# Patient Record
Sex: FEMALE | Race: WHITE | NOT HISPANIC OR LATINO | Employment: OTHER | URBAN - METROPOLITAN AREA
[De-identification: names, ages, dates, MRNs, and addresses within clinical notes are randomized per-mention and may not be internally consistent; named-entity substitution may affect disease eponyms.]

---

## 2017-01-13 ENCOUNTER — RECORDS - HEALTHEAST (OUTPATIENT)
Dept: GENERAL RADIOLOGY | Facility: CLINIC | Age: 82
End: 2017-01-13

## 2017-01-13 ENCOUNTER — OFFICE VISIT - HEALTHEAST (OUTPATIENT)
Dept: INTERNAL MEDICINE | Facility: CLINIC | Age: 82
End: 2017-01-13

## 2017-01-13 DIAGNOSIS — M25.539 PAIN IN UNSPECIFIED WRIST: ICD-10-CM

## 2017-01-13 DIAGNOSIS — M25.539 WRIST PAIN: ICD-10-CM

## 2017-01-13 ASSESSMENT — MIFFLIN-ST. JEOR: SCORE: 935.77

## 2017-01-14 ENCOUNTER — COMMUNICATION - HEALTHEAST (OUTPATIENT)
Dept: SCHEDULING | Facility: CLINIC | Age: 82
End: 2017-01-14

## 2017-01-17 ENCOUNTER — RECORDS - HEALTHEAST (OUTPATIENT)
Dept: ADMINISTRATIVE | Facility: OTHER | Age: 82
End: 2017-01-17

## 2017-01-24 ENCOUNTER — RECORDS - HEALTHEAST (OUTPATIENT)
Dept: ADMINISTRATIVE | Facility: OTHER | Age: 82
End: 2017-01-24

## 2017-01-31 ENCOUNTER — COMMUNICATION - HEALTHEAST (OUTPATIENT)
Dept: INTERNAL MEDICINE | Facility: CLINIC | Age: 82
End: 2017-01-31

## 2017-02-07 ENCOUNTER — COMMUNICATION - HEALTHEAST (OUTPATIENT)
Dept: INTERNAL MEDICINE | Facility: CLINIC | Age: 82
End: 2017-02-07

## 2017-02-14 ENCOUNTER — RECORDS - HEALTHEAST (OUTPATIENT)
Dept: ADMINISTRATIVE | Facility: OTHER | Age: 82
End: 2017-02-14

## 2017-02-21 ENCOUNTER — OFFICE VISIT - HEALTHEAST (OUTPATIENT)
Dept: INTERNAL MEDICINE | Facility: CLINIC | Age: 82
End: 2017-02-21

## 2017-02-21 DIAGNOSIS — I48.20 CHRONIC ATRIAL FIBRILLATION (H): ICD-10-CM

## 2017-02-21 LAB
CHOLEST SERPL-MCNC: 180 MG/DL
FASTING STATUS PATIENT QL REPORTED: YES
HDLC SERPL-MCNC: 76 MG/DL
LDLC SERPL CALC-MCNC: 85 MG/DL
TRIGL SERPL-MCNC: 93 MG/DL

## 2017-02-21 ASSESSMENT — MIFFLIN-ST. JEOR: SCORE: 944.84

## 2017-02-22 ENCOUNTER — COMMUNICATION - HEALTHEAST (OUTPATIENT)
Dept: INTERNAL MEDICINE | Facility: CLINIC | Age: 82
End: 2017-02-22

## 2017-03-14 ENCOUNTER — RECORDS - HEALTHEAST (OUTPATIENT)
Dept: ADMINISTRATIVE | Facility: OTHER | Age: 82
End: 2017-03-14

## 2017-03-20 ENCOUNTER — COMMUNICATION - HEALTHEAST (OUTPATIENT)
Dept: INTERNAL MEDICINE | Facility: CLINIC | Age: 82
End: 2017-03-20

## 2017-04-21 ENCOUNTER — COMMUNICATION - HEALTHEAST (OUTPATIENT)
Dept: INTERNAL MEDICINE | Facility: CLINIC | Age: 82
End: 2017-04-21

## 2017-04-24 ENCOUNTER — OFFICE VISIT - HEALTHEAST (OUTPATIENT)
Dept: INTERNAL MEDICINE | Facility: CLINIC | Age: 82
End: 2017-04-24

## 2017-04-24 DIAGNOSIS — I63.419 CEREBRAL INFARCTION DUE TO EMBOLISM OF MIDDLE CEREBRAL ARTERY, UNSPECIFIED BLOOD VESSEL LATERALITY (H): ICD-10-CM

## 2017-04-24 LAB
CHOLEST SERPL-MCNC: 158 MG/DL
FASTING STATUS PATIENT QL REPORTED: YES
HDLC SERPL-MCNC: 64 MG/DL
LDLC SERPL CALC-MCNC: 78 MG/DL
TRIGL SERPL-MCNC: 81 MG/DL

## 2017-04-24 ASSESSMENT — MIFFLIN-ST. JEOR: SCORE: 951.64

## 2017-04-25 ENCOUNTER — COMMUNICATION - HEALTHEAST (OUTPATIENT)
Dept: INTERNAL MEDICINE | Facility: CLINIC | Age: 82
End: 2017-04-25

## 2017-04-28 ENCOUNTER — RECORDS - HEALTHEAST (OUTPATIENT)
Dept: ADMINISTRATIVE | Facility: OTHER | Age: 82
End: 2017-04-28

## 2017-05-01 ENCOUNTER — RECORDS - HEALTHEAST (OUTPATIENT)
Dept: ADMINISTRATIVE | Facility: OTHER | Age: 82
End: 2017-05-01

## 2017-05-03 ENCOUNTER — RECORDS - HEALTHEAST (OUTPATIENT)
Dept: ADMINISTRATIVE | Facility: OTHER | Age: 82
End: 2017-05-03

## 2017-05-12 ENCOUNTER — COMMUNICATION - HEALTHEAST (OUTPATIENT)
Dept: INTERNAL MEDICINE | Facility: CLINIC | Age: 82
End: 2017-05-12

## 2017-05-12 DIAGNOSIS — I63.9 CVA (CEREBRAL VASCULAR ACCIDENT) (H): ICD-10-CM

## 2017-05-16 ENCOUNTER — OFFICE VISIT - HEALTHEAST (OUTPATIENT)
Dept: PODIATRY | Age: 82
End: 2017-05-16

## 2017-05-16 DIAGNOSIS — M20.40 HAMMER TOE, UNSPECIFIED LATERALITY: ICD-10-CM

## 2017-05-30 ENCOUNTER — RECORDS - HEALTHEAST (OUTPATIENT)
Dept: ADMINISTRATIVE | Facility: OTHER | Age: 82
End: 2017-05-30

## 2017-06-05 ENCOUNTER — RECORDS - HEALTHEAST (OUTPATIENT)
Dept: ADMINISTRATIVE | Facility: OTHER | Age: 82
End: 2017-06-05

## 2017-06-20 ENCOUNTER — COMMUNICATION - HEALTHEAST (OUTPATIENT)
Dept: INTERNAL MEDICINE | Facility: CLINIC | Age: 82
End: 2017-06-20

## 2017-06-26 ENCOUNTER — COMMUNICATION - HEALTHEAST (OUTPATIENT)
Dept: INTERNAL MEDICINE | Facility: CLINIC | Age: 82
End: 2017-06-26

## 2017-06-26 ENCOUNTER — OFFICE VISIT - HEALTHEAST (OUTPATIENT)
Dept: INTERNAL MEDICINE | Facility: CLINIC | Age: 82
End: 2017-06-26

## 2017-06-26 DIAGNOSIS — I63.419 CEREBRAL INFARCTION DUE TO EMBOLISM OF MIDDLE CEREBRAL ARTERY, UNSPECIFIED BLOOD VESSEL LATERALITY (H): ICD-10-CM

## 2017-06-26 LAB
CHOLEST SERPL-MCNC: 193 MG/DL
FASTING STATUS PATIENT QL REPORTED: YES
HDLC SERPL-MCNC: 61 MG/DL
LDLC SERPL CALC-MCNC: 117 MG/DL
TRIGL SERPL-MCNC: 74 MG/DL

## 2017-06-26 ASSESSMENT — MIFFLIN-ST. JEOR: SCORE: 933.5

## 2017-06-27 ENCOUNTER — COMMUNICATION - HEALTHEAST (OUTPATIENT)
Dept: INTERNAL MEDICINE | Facility: CLINIC | Age: 82
End: 2017-06-27

## 2017-07-06 ENCOUNTER — COMMUNICATION - HEALTHEAST (OUTPATIENT)
Dept: INTERNAL MEDICINE | Facility: CLINIC | Age: 82
End: 2017-07-06

## 2017-07-10 ENCOUNTER — COMMUNICATION - HEALTHEAST (OUTPATIENT)
Dept: INTERNAL MEDICINE | Facility: CLINIC | Age: 82
End: 2017-07-10

## 2017-07-14 ENCOUNTER — COMMUNICATION - HEALTHEAST (OUTPATIENT)
Dept: INTERNAL MEDICINE | Facility: CLINIC | Age: 82
End: 2017-07-14

## 2017-07-16 ENCOUNTER — COMMUNICATION - HEALTHEAST (OUTPATIENT)
Dept: INTERNAL MEDICINE | Facility: CLINIC | Age: 82
End: 2017-07-16

## 2017-07-17 ENCOUNTER — COMMUNICATION - HEALTHEAST (OUTPATIENT)
Dept: INTERNAL MEDICINE | Facility: CLINIC | Age: 82
End: 2017-07-17

## 2017-07-19 ENCOUNTER — COMMUNICATION - HEALTHEAST (OUTPATIENT)
Dept: INTERNAL MEDICINE | Facility: CLINIC | Age: 82
End: 2017-07-19

## 2017-07-28 ENCOUNTER — COMMUNICATION - HEALTHEAST (OUTPATIENT)
Dept: INTERNAL MEDICINE | Facility: CLINIC | Age: 82
End: 2017-07-28

## 2017-07-31 ENCOUNTER — COMMUNICATION - HEALTHEAST (OUTPATIENT)
Dept: INTERNAL MEDICINE | Facility: CLINIC | Age: 82
End: 2017-07-31

## 2017-07-31 ENCOUNTER — OFFICE VISIT - HEALTHEAST (OUTPATIENT)
Dept: INTERNAL MEDICINE | Facility: CLINIC | Age: 82
End: 2017-07-31

## 2017-07-31 DIAGNOSIS — I63.419 CEREBRAL INFARCTION DUE TO EMBOLISM OF MIDDLE CEREBRAL ARTERY, UNSPECIFIED BLOOD VESSEL LATERALITY (H): ICD-10-CM

## 2017-07-31 ASSESSMENT — MIFFLIN-ST. JEOR: SCORE: 942.57

## 2017-08-28 ENCOUNTER — RECORDS - HEALTHEAST (OUTPATIENT)
Dept: ADMINISTRATIVE | Facility: OTHER | Age: 82
End: 2017-08-28

## 2017-09-05 ENCOUNTER — OFFICE VISIT - HEALTHEAST (OUTPATIENT)
Dept: INTERNAL MEDICINE | Facility: CLINIC | Age: 82
End: 2017-09-05

## 2017-09-05 ENCOUNTER — COMMUNICATION - HEALTHEAST (OUTPATIENT)
Dept: INTERNAL MEDICINE | Facility: CLINIC | Age: 82
End: 2017-09-05

## 2017-09-05 DIAGNOSIS — I63.419 CEREBRAL INFARCTION DUE TO EMBOLISM OF MIDDLE CEREBRAL ARTERY, UNSPECIFIED BLOOD VESSEL LATERALITY (H): ICD-10-CM

## 2017-09-05 LAB
CHOLEST SERPL-MCNC: 205 MG/DL
FASTING STATUS PATIENT QL REPORTED: ABNORMAL
HDLC SERPL-MCNC: 62 MG/DL
LDLC SERPL CALC-MCNC: 123 MG/DL
TRIGL SERPL-MCNC: 98 MG/DL

## 2017-09-05 ASSESSMENT — MIFFLIN-ST. JEOR: SCORE: 924.42

## 2017-09-07 ENCOUNTER — COMMUNICATION - HEALTHEAST (OUTPATIENT)
Dept: INTERNAL MEDICINE | Facility: CLINIC | Age: 82
End: 2017-09-07

## 2017-09-20 ENCOUNTER — RECORDS - HEALTHEAST (OUTPATIENT)
Dept: ADMINISTRATIVE | Facility: OTHER | Age: 82
End: 2017-09-20

## 2017-09-29 ENCOUNTER — RECORDS - HEALTHEAST (OUTPATIENT)
Dept: ADMINISTRATIVE | Facility: OTHER | Age: 82
End: 2017-09-29

## 2017-10-04 ENCOUNTER — AMBULATORY - HEALTHEAST (OUTPATIENT)
Dept: INTERNAL MEDICINE | Facility: CLINIC | Age: 82
End: 2017-10-04

## 2017-10-04 ENCOUNTER — COMMUNICATION - HEALTHEAST (OUTPATIENT)
Dept: INTERNAL MEDICINE | Facility: CLINIC | Age: 82
End: 2017-10-04

## 2017-10-04 DIAGNOSIS — I63.9 STROKE (H): ICD-10-CM

## 2017-10-05 ENCOUNTER — HOME CARE/HOSPICE - HEALTHEAST (OUTPATIENT)
Dept: HOME HEALTH SERVICES | Facility: HOME HEALTH | Age: 82
End: 2017-10-05

## 2017-10-05 ENCOUNTER — COMMUNICATION - HEALTHEAST (OUTPATIENT)
Dept: NURSING | Facility: CLINIC | Age: 82
End: 2017-10-05

## 2017-10-06 ENCOUNTER — COMMUNICATION - HEALTHEAST (OUTPATIENT)
Dept: HOME HEALTH SERVICES | Facility: HOME HEALTH | Age: 82
End: 2017-10-06

## 2017-10-13 ENCOUNTER — COMMUNICATION - HEALTHEAST (OUTPATIENT)
Dept: INTERNAL MEDICINE | Facility: CLINIC | Age: 82
End: 2017-10-13

## 2017-10-24 ENCOUNTER — COMMUNICATION - HEALTHEAST (OUTPATIENT)
Dept: NURSING | Facility: CLINIC | Age: 82
End: 2017-10-24

## 2017-10-25 ENCOUNTER — AMBULATORY - HEALTHEAST (OUTPATIENT)
Dept: CARE COORDINATION | Facility: CLINIC | Age: 82
End: 2017-10-25

## 2017-11-06 ENCOUNTER — OFFICE VISIT - HEALTHEAST (OUTPATIENT)
Dept: INTERNAL MEDICINE | Facility: CLINIC | Age: 82
End: 2017-11-06

## 2017-11-06 ENCOUNTER — COMMUNICATION - HEALTHEAST (OUTPATIENT)
Dept: NURSING | Facility: CLINIC | Age: 82
End: 2017-11-06

## 2017-11-06 ENCOUNTER — AMBULATORY - HEALTHEAST (OUTPATIENT)
Dept: INTERNAL MEDICINE | Facility: CLINIC | Age: 82
End: 2017-11-06

## 2017-11-06 ENCOUNTER — HOME CARE/HOSPICE - HEALTHEAST (OUTPATIENT)
Dept: HOME HEALTH SERVICES | Facility: HOME HEALTH | Age: 82
End: 2017-11-06

## 2017-11-06 DIAGNOSIS — I63.9 CVA (CEREBRAL VASCULAR ACCIDENT) (H): ICD-10-CM

## 2017-11-06 DIAGNOSIS — I48.20 CHRONIC ATRIAL FIBRILLATION (H): ICD-10-CM

## 2017-11-06 DIAGNOSIS — I63.419 CEREBRAL INFARCTION DUE TO EMBOLISM OF MIDDLE CEREBRAL ARTERY, UNSPECIFIED BLOOD VESSEL LATERALITY (H): ICD-10-CM

## 2017-11-06 ASSESSMENT — MIFFLIN-ST. JEOR: SCORE: 938.03

## 2017-11-08 ENCOUNTER — HOME CARE/HOSPICE - HEALTHEAST (OUTPATIENT)
Dept: HOME HEALTH SERVICES | Facility: HOME HEALTH | Age: 82
End: 2017-11-08

## 2017-11-08 ASSESSMENT — MIFFLIN-ST. JEOR: SCORE: 938.03

## 2017-11-15 ENCOUNTER — AMBULATORY - HEALTHEAST (OUTPATIENT)
Dept: INTERNAL MEDICINE | Facility: CLINIC | Age: 82
End: 2017-11-15

## 2017-11-30 ENCOUNTER — COMMUNICATION - HEALTHEAST (OUTPATIENT)
Dept: NURSING | Facility: CLINIC | Age: 82
End: 2017-11-30

## 2017-12-22 ENCOUNTER — COMMUNICATION - HEALTHEAST (OUTPATIENT)
Dept: NURSING | Facility: CLINIC | Age: 82
End: 2017-12-22

## 2018-01-08 ENCOUNTER — COMMUNICATION - HEALTHEAST (OUTPATIENT)
Dept: INTERNAL MEDICINE | Facility: CLINIC | Age: 83
End: 2018-01-08

## 2018-01-26 ENCOUNTER — OFFICE VISIT - HEALTHEAST (OUTPATIENT)
Dept: INTERNAL MEDICINE | Facility: CLINIC | Age: 83
End: 2018-01-26

## 2018-01-26 DIAGNOSIS — I63.419 CEREBRAL INFARCTION DUE TO EMBOLISM OF MIDDLE CEREBRAL ARTERY, UNSPECIFIED BLOOD VESSEL LATERALITY (H): ICD-10-CM

## 2018-01-26 LAB
CHOLEST SERPL-MCNC: 170 MG/DL
FASTING STATUS PATIENT QL REPORTED: NORMAL
HDLC SERPL-MCNC: 69 MG/DL
LDLC SERPL CALC-MCNC: 87 MG/DL
TRIGL SERPL-MCNC: 71 MG/DL

## 2018-01-26 ASSESSMENT — MIFFLIN-ST. JEOR: SCORE: 933.5

## 2018-01-29 ENCOUNTER — COMMUNICATION - HEALTHEAST (OUTPATIENT)
Dept: INTERNAL MEDICINE | Facility: CLINIC | Age: 83
End: 2018-01-29

## 2018-01-30 ENCOUNTER — COMMUNICATION - HEALTHEAST (OUTPATIENT)
Dept: NURSING | Facility: CLINIC | Age: 83
End: 2018-01-30

## 2018-01-30 ENCOUNTER — AMBULATORY - HEALTHEAST (OUTPATIENT)
Dept: INTERNAL MEDICINE | Facility: CLINIC | Age: 83
End: 2018-01-30

## 2018-01-30 DIAGNOSIS — I63.9 STROKE (H): ICD-10-CM

## 2018-02-20 ENCOUNTER — RECORDS - HEALTHEAST (OUTPATIENT)
Dept: ADMINISTRATIVE | Facility: OTHER | Age: 83
End: 2018-02-20

## 2018-02-27 ENCOUNTER — COMMUNICATION - HEALTHEAST (OUTPATIENT)
Dept: NURSING | Facility: CLINIC | Age: 83
End: 2018-02-27

## 2018-03-09 ENCOUNTER — COMMUNICATION - HEALTHEAST (OUTPATIENT)
Dept: INTERNAL MEDICINE | Facility: CLINIC | Age: 83
End: 2018-03-09

## 2018-03-12 ENCOUNTER — COMMUNICATION - HEALTHEAST (OUTPATIENT)
Dept: INTERNAL MEDICINE | Facility: CLINIC | Age: 83
End: 2018-03-12

## 2018-03-12 ENCOUNTER — RECORDS - HEALTHEAST (OUTPATIENT)
Dept: ADMINISTRATIVE | Facility: OTHER | Age: 83
End: 2018-03-12

## 2018-03-12 DIAGNOSIS — R56.9 SEIZURE (H): ICD-10-CM

## 2018-03-16 ENCOUNTER — RECORDS - HEALTHEAST (OUTPATIENT)
Dept: ADMINISTRATIVE | Facility: OTHER | Age: 83
End: 2018-03-16

## 2018-03-20 ENCOUNTER — COMMUNICATION - HEALTHEAST (OUTPATIENT)
Dept: NURSING | Facility: CLINIC | Age: 83
End: 2018-03-20

## 2018-03-27 ENCOUNTER — OFFICE VISIT - HEALTHEAST (OUTPATIENT)
Dept: INTERNAL MEDICINE | Facility: CLINIC | Age: 83
End: 2018-03-27

## 2018-03-27 ENCOUNTER — COMMUNICATION - HEALTHEAST (OUTPATIENT)
Dept: INTERNAL MEDICINE | Facility: CLINIC | Age: 83
End: 2018-03-27

## 2018-03-27 DIAGNOSIS — I63.419 CEREBRAL INFARCTION DUE TO EMBOLISM OF MIDDLE CEREBRAL ARTERY, UNSPECIFIED BLOOD VESSEL LATERALITY (H): ICD-10-CM

## 2018-03-27 LAB
CHOLEST SERPL-MCNC: 165 MG/DL
FASTING STATUS PATIENT QL REPORTED: NORMAL
HDLC SERPL-MCNC: 68 MG/DL
LDLC SERPL CALC-MCNC: 82 MG/DL
TRIGL SERPL-MCNC: 77 MG/DL

## 2018-03-27 ASSESSMENT — MIFFLIN-ST. JEOR: SCORE: 933.5

## 2018-04-10 ENCOUNTER — RECORDS - HEALTHEAST (OUTPATIENT)
Dept: ADMINISTRATIVE | Facility: OTHER | Age: 83
End: 2018-04-10

## 2018-04-13 ENCOUNTER — COMMUNICATION - HEALTHEAST (OUTPATIENT)
Dept: NURSING | Facility: CLINIC | Age: 83
End: 2018-04-13

## 2018-05-08 ENCOUNTER — RECORDS - HEALTHEAST (OUTPATIENT)
Dept: ADMINISTRATIVE | Facility: OTHER | Age: 83
End: 2018-05-08

## 2018-05-12 ENCOUNTER — COMMUNICATION - HEALTHEAST (OUTPATIENT)
Dept: INTERNAL MEDICINE | Facility: CLINIC | Age: 83
End: 2018-05-12

## 2018-05-15 ENCOUNTER — COMMUNICATION - HEALTHEAST (OUTPATIENT)
Dept: NURSING | Facility: CLINIC | Age: 83
End: 2018-05-15

## 2018-05-22 ENCOUNTER — COMMUNICATION - HEALTHEAST (OUTPATIENT)
Dept: NURSING | Facility: CLINIC | Age: 83
End: 2018-05-22

## 2018-06-01 ENCOUNTER — COMMUNICATION - HEALTHEAST (OUTPATIENT)
Dept: NURSING | Facility: CLINIC | Age: 83
End: 2018-06-01

## 2018-06-28 ENCOUNTER — OFFICE VISIT - HEALTHEAST (OUTPATIENT)
Dept: INTERNAL MEDICINE | Facility: CLINIC | Age: 83
End: 2018-06-28

## 2018-06-28 DIAGNOSIS — I10 ESSENTIAL HYPERTENSION: ICD-10-CM

## 2018-06-28 ASSESSMENT — MIFFLIN-ST. JEOR: SCORE: 928.96

## 2018-07-10 ENCOUNTER — COMMUNICATION - HEALTHEAST (OUTPATIENT)
Dept: NURSING | Facility: CLINIC | Age: 83
End: 2018-07-10

## 2018-07-15 ENCOUNTER — COMMUNICATION - HEALTHEAST (OUTPATIENT)
Dept: INTERNAL MEDICINE | Facility: CLINIC | Age: 83
End: 2018-07-15

## 2018-07-24 ENCOUNTER — COMMUNICATION - HEALTHEAST (OUTPATIENT)
Dept: NURSING | Facility: CLINIC | Age: 83
End: 2018-07-24

## 2018-07-31 ENCOUNTER — AMBULATORY - HEALTHEAST (OUTPATIENT)
Dept: INTERNAL MEDICINE | Facility: CLINIC | Age: 83
End: 2018-07-31

## 2018-08-29 ENCOUNTER — OFFICE VISIT - HEALTHEAST (OUTPATIENT)
Dept: INTERNAL MEDICINE | Facility: CLINIC | Age: 83
End: 2018-08-29

## 2018-08-29 DIAGNOSIS — I63.419 CEREBRAL INFARCTION DUE TO EMBOLISM OF MIDDLE CEREBRAL ARTERY, UNSPECIFIED BLOOD VESSEL LATERALITY (H): ICD-10-CM

## 2018-08-29 LAB
CHOLEST SERPL-MCNC: 185 MG/DL
FASTING STATUS PATIENT QL REPORTED: NORMAL
HDLC SERPL-MCNC: 72 MG/DL
LDLC SERPL CALC-MCNC: 98 MG/DL
TRIGL SERPL-MCNC: 74 MG/DL

## 2018-08-29 ASSESSMENT — MIFFLIN-ST. JEOR: SCORE: 947.1

## 2018-08-30 ENCOUNTER — COMMUNICATION - HEALTHEAST (OUTPATIENT)
Dept: INTERNAL MEDICINE | Facility: CLINIC | Age: 83
End: 2018-08-30

## 2018-08-30 ENCOUNTER — COMMUNICATION - HEALTHEAST (OUTPATIENT)
Dept: NURSING | Facility: CLINIC | Age: 83
End: 2018-08-30

## 2018-08-30 ENCOUNTER — RECORDS - HEALTHEAST (OUTPATIENT)
Dept: ADMINISTRATIVE | Facility: OTHER | Age: 83
End: 2018-08-30

## 2018-08-31 ENCOUNTER — COMMUNICATION - HEALTHEAST (OUTPATIENT)
Dept: INTERNAL MEDICINE | Facility: CLINIC | Age: 83
End: 2018-08-31

## 2018-08-31 ENCOUNTER — RECORDS - HEALTHEAST (OUTPATIENT)
Dept: ADMINISTRATIVE | Facility: OTHER | Age: 83
End: 2018-08-31

## 2018-09-01 ENCOUNTER — COMMUNICATION - HEALTHEAST (OUTPATIENT)
Dept: INTERNAL MEDICINE | Facility: CLINIC | Age: 83
End: 2018-09-01

## 2018-09-04 ENCOUNTER — AMBULATORY - HEALTHEAST (OUTPATIENT)
Dept: INTERNAL MEDICINE | Facility: CLINIC | Age: 83
End: 2018-09-04

## 2018-09-10 ENCOUNTER — OFFICE VISIT - HEALTHEAST (OUTPATIENT)
Dept: INTERNAL MEDICINE | Facility: CLINIC | Age: 83
End: 2018-09-10

## 2018-09-10 DIAGNOSIS — I48.20 CHRONIC ATRIAL FIBRILLATION (H): ICD-10-CM

## 2018-09-10 ASSESSMENT — MIFFLIN-ST. JEOR: SCORE: 928.96

## 2018-09-11 ENCOUNTER — COMMUNICATION - HEALTHEAST (OUTPATIENT)
Dept: INTERNAL MEDICINE | Facility: CLINIC | Age: 83
End: 2018-09-11

## 2018-10-09 ENCOUNTER — COMMUNICATION - HEALTHEAST (OUTPATIENT)
Dept: NURSING | Facility: CLINIC | Age: 83
End: 2018-10-09

## 2018-10-22 ENCOUNTER — COMMUNICATION - HEALTHEAST (OUTPATIENT)
Dept: NURSING | Facility: CLINIC | Age: 83
End: 2018-10-22

## 2018-11-09 ENCOUNTER — COMMUNICATION - HEALTHEAST (OUTPATIENT)
Dept: NURSING | Facility: CLINIC | Age: 83
End: 2018-11-09

## 2018-12-11 ENCOUNTER — COMMUNICATION - HEALTHEAST (OUTPATIENT)
Dept: NURSING | Facility: CLINIC | Age: 83
End: 2018-12-11

## 2018-12-18 ENCOUNTER — COMMUNICATION - HEALTHEAST (OUTPATIENT)
Dept: NURSING | Facility: CLINIC | Age: 83
End: 2018-12-18

## 2018-12-26 ENCOUNTER — COMMUNICATION - HEALTHEAST (OUTPATIENT)
Dept: NURSING | Facility: CLINIC | Age: 83
End: 2018-12-26

## 2019-01-04 ENCOUNTER — OFFICE VISIT - HEALTHEAST (OUTPATIENT)
Dept: INTERNAL MEDICINE | Facility: CLINIC | Age: 84
End: 2019-01-04

## 2019-01-04 DIAGNOSIS — I63.419 CEREBRAL INFARCTION DUE TO EMBOLISM OF MIDDLE CEREBRAL ARTERY, UNSPECIFIED BLOOD VESSEL LATERALITY (H): ICD-10-CM

## 2019-01-04 RX ORDER — PRAVASTATIN SODIUM 40 MG
40 TABLET ORAL AT BEDTIME
Status: SHIPPED | COMMUNITY
Start: 2019-01-04 | End: 2021-11-12

## 2019-01-04 ASSESSMENT — MIFFLIN-ST. JEOR: SCORE: 947.1

## 2019-01-31 ENCOUNTER — COMMUNICATION - HEALTHEAST (OUTPATIENT)
Dept: NURSING | Facility: CLINIC | Age: 84
End: 2019-01-31

## 2019-02-22 ENCOUNTER — COMMUNICATION - HEALTHEAST (OUTPATIENT)
Dept: INTERNAL MEDICINE | Facility: CLINIC | Age: 84
End: 2019-02-22

## 2019-02-22 ENCOUNTER — AMBULATORY - HEALTHEAST (OUTPATIENT)
Dept: INTERNAL MEDICINE | Facility: CLINIC | Age: 84
End: 2019-02-22

## 2019-02-22 DIAGNOSIS — M62.579: ICD-10-CM

## 2019-03-01 ENCOUNTER — DOCUMENTATION ONLY (OUTPATIENT)
Dept: OTHER | Facility: CLINIC | Age: 84
End: 2019-03-01

## 2019-03-01 ENCOUNTER — COMMUNICATION - HEALTHEAST (OUTPATIENT)
Dept: NURSING | Facility: CLINIC | Age: 84
End: 2019-03-01

## 2019-03-01 ENCOUNTER — AMBULATORY - HEALTHEAST (OUTPATIENT)
Dept: OTHER | Facility: CLINIC | Age: 84
End: 2019-03-01

## 2019-03-07 ENCOUNTER — COMMUNICATION - HEALTHEAST (OUTPATIENT)
Dept: INTERNAL MEDICINE | Facility: CLINIC | Age: 84
End: 2019-03-07

## 2019-03-07 DIAGNOSIS — R56.9 SEIZURE (H): ICD-10-CM

## 2019-03-12 ENCOUNTER — AMBULATORY - HEALTHEAST (OUTPATIENT)
Dept: NURSING | Facility: CLINIC | Age: 84
End: 2019-03-12

## 2019-03-19 ENCOUNTER — COMMUNICATION - HEALTHEAST (OUTPATIENT)
Dept: NURSING | Facility: CLINIC | Age: 84
End: 2019-03-19

## 2019-04-09 ENCOUNTER — COMMUNICATION - HEALTHEAST (OUTPATIENT)
Dept: NURSING | Facility: CLINIC | Age: 84
End: 2019-04-09

## 2019-04-15 ENCOUNTER — OFFICE VISIT - HEALTHEAST (OUTPATIENT)
Dept: INTERNAL MEDICINE | Facility: CLINIC | Age: 84
End: 2019-04-15

## 2019-04-15 DIAGNOSIS — I48.20 CHRONIC ATRIAL FIBRILLATION (H): ICD-10-CM

## 2019-04-15 DIAGNOSIS — R56.9 CONVULSIONS, UNSPECIFIED CONVULSION TYPE (H): ICD-10-CM

## 2019-04-15 DIAGNOSIS — C50.919 MALIGNANT NEOPLASM OF FEMALE BREAST, UNSPECIFIED ESTROGEN RECEPTOR STATUS, UNSPECIFIED LATERALITY, UNSPECIFIED SITE OF BREAST (H): ICD-10-CM

## 2019-04-15 DIAGNOSIS — I10 ESSENTIAL HYPERTENSION: ICD-10-CM

## 2019-04-15 LAB
CHOLEST SERPL-MCNC: 156 MG/DL
FASTING STATUS PATIENT QL REPORTED: YES
HDLC SERPL-MCNC: 64 MG/DL
LDLC SERPL CALC-MCNC: 78 MG/DL
TRIGL SERPL-MCNC: 68 MG/DL

## 2019-04-15 ASSESSMENT — MIFFLIN-ST. JEOR: SCORE: 942.57

## 2019-04-16 ENCOUNTER — COMMUNICATION - HEALTHEAST (OUTPATIENT)
Dept: INTERNAL MEDICINE | Facility: CLINIC | Age: 84
End: 2019-04-16

## 2019-04-24 ENCOUNTER — COMMUNICATION - HEALTHEAST (OUTPATIENT)
Dept: NURSING | Facility: CLINIC | Age: 84
End: 2019-04-24

## 2019-05-08 ENCOUNTER — COMMUNICATION - HEALTHEAST (OUTPATIENT)
Dept: INTERNAL MEDICINE | Facility: CLINIC | Age: 84
End: 2019-05-08

## 2019-05-24 ENCOUNTER — COMMUNICATION - HEALTHEAST (OUTPATIENT)
Dept: NURSING | Facility: CLINIC | Age: 84
End: 2019-05-24

## 2019-06-05 ENCOUNTER — COMMUNICATION - HEALTHEAST (OUTPATIENT)
Dept: NURSING | Facility: CLINIC | Age: 84
End: 2019-06-05

## 2019-07-16 ENCOUNTER — COMMUNICATION - HEALTHEAST (OUTPATIENT)
Dept: NURSING | Facility: CLINIC | Age: 84
End: 2019-07-16

## 2019-07-23 ENCOUNTER — COMMUNICATION - HEALTHEAST (OUTPATIENT)
Dept: NURSING | Facility: CLINIC | Age: 84
End: 2019-07-23

## 2019-07-30 ENCOUNTER — OFFICE VISIT - HEALTHEAST (OUTPATIENT)
Dept: INTERNAL MEDICINE | Facility: CLINIC | Age: 84
End: 2019-07-30

## 2019-07-30 DIAGNOSIS — I63.419 CEREBRAL INFARCTION DUE TO EMBOLISM OF MIDDLE CEREBRAL ARTERY, UNSPECIFIED BLOOD VESSEL LATERALITY (H): ICD-10-CM

## 2019-07-30 ASSESSMENT — MIFFLIN-ST. JEOR: SCORE: 951.64

## 2019-08-02 ENCOUNTER — COMMUNICATION - HEALTHEAST (OUTPATIENT)
Dept: NURSING | Facility: CLINIC | Age: 84
End: 2019-08-02

## 2019-08-30 ENCOUNTER — COMMUNICATION - HEALTHEAST (OUTPATIENT)
Dept: NURSING | Facility: CLINIC | Age: 84
End: 2019-08-30

## 2019-10-20 ENCOUNTER — COMMUNICATION - HEALTHEAST (OUTPATIENT)
Dept: SCHEDULING | Facility: CLINIC | Age: 84
End: 2019-10-20

## 2019-11-07 ENCOUNTER — OFFICE VISIT - HEALTHEAST (OUTPATIENT)
Dept: INTERNAL MEDICINE | Facility: CLINIC | Age: 84
End: 2019-11-07

## 2019-11-07 DIAGNOSIS — I10 ESSENTIAL HYPERTENSION: ICD-10-CM

## 2019-11-07 LAB
CHOLEST SERPL-MCNC: 162 MG/DL
FASTING STATUS PATIENT QL REPORTED: NORMAL
HDLC SERPL-MCNC: 66 MG/DL
LDLC SERPL CALC-MCNC: 84 MG/DL
TRIGL SERPL-MCNC: 60 MG/DL

## 2019-11-07 ASSESSMENT — MIFFLIN-ST. JEOR: SCORE: 974.32

## 2019-11-08 ENCOUNTER — COMMUNICATION - HEALTHEAST (OUTPATIENT)
Dept: INTERNAL MEDICINE | Facility: CLINIC | Age: 84
End: 2019-11-08

## 2020-01-06 ENCOUNTER — COMMUNICATION - HEALTHEAST (OUTPATIENT)
Dept: INTERNAL MEDICINE | Facility: CLINIC | Age: 85
End: 2020-01-06

## 2020-01-06 DIAGNOSIS — R56.9 SEIZURE (H): ICD-10-CM

## 2020-06-24 ENCOUNTER — COMMUNICATION - HEALTHEAST (OUTPATIENT)
Dept: SCHEDULING | Facility: CLINIC | Age: 85
End: 2020-06-24

## 2020-06-24 ENCOUNTER — NURSE TRIAGE (OUTPATIENT)
Dept: NURSING | Facility: CLINIC | Age: 85
End: 2020-06-24

## 2020-06-24 NOTE — TELEPHONE ENCOUNTER
Patient is a HE patient- see encounter on [facility]    Teresita Colbert RN BSN 6/24/2020 8:13 AM

## 2020-10-29 ENCOUNTER — COMMUNICATION - HEALTHEAST (OUTPATIENT)
Dept: INTERNAL MEDICINE | Facility: CLINIC | Age: 85
End: 2020-10-29

## 2020-11-03 ENCOUNTER — COMMUNICATION - HEALTHEAST (OUTPATIENT)
Dept: INTERNAL MEDICINE | Facility: CLINIC | Age: 85
End: 2020-11-03

## 2020-11-03 ENCOUNTER — OFFICE VISIT - HEALTHEAST (OUTPATIENT)
Dept: INTERNAL MEDICINE | Facility: CLINIC | Age: 85
End: 2020-11-03

## 2020-11-03 DIAGNOSIS — I63.419 CEREBRAL INFARCTION DUE TO EMBOLISM OF MIDDLE CEREBRAL ARTERY, UNSPECIFIED BLOOD VESSEL LATERALITY (H): ICD-10-CM

## 2020-11-03 LAB
ALBUMIN SERPL-MCNC: 4.1 G/DL (ref 3.5–5)
ALP SERPL-CCNC: 64 U/L (ref 45–120)
ALT SERPL W P-5'-P-CCNC: 30 U/L (ref 0–45)
ANION GAP SERPL CALCULATED.3IONS-SCNC: 8 MMOL/L (ref 5–18)
AST SERPL W P-5'-P-CCNC: 29 U/L (ref 0–40)
BILIRUB SERPL-MCNC: 1 MG/DL (ref 0–1)
BUN SERPL-MCNC: 18 MG/DL (ref 8–28)
CALCIUM SERPL-MCNC: 9.4 MG/DL (ref 8.5–10.5)
CHLORIDE BLD-SCNC: 107 MMOL/L (ref 98–107)
CHOLEST SERPL-MCNC: 158 MG/DL
CO2 SERPL-SCNC: 25 MMOL/L (ref 22–31)
CREAT SERPL-MCNC: 0.93 MG/DL (ref 0.6–1.1)
FASTING STATUS PATIENT QL REPORTED: YES
GFR SERPL CREATININE-BSD FRML MDRD: 57 ML/MIN/1.73M2
GLUCOSE BLD-MCNC: 91 MG/DL (ref 70–125)
HDLC SERPL-MCNC: 70 MG/DL
LDLC SERPL CALC-MCNC: 74 MG/DL
POTASSIUM BLD-SCNC: 4.5 MMOL/L (ref 3.5–5)
PROT SERPL-MCNC: 7 G/DL (ref 6–8)
SODIUM SERPL-SCNC: 140 MMOL/L (ref 136–145)
TRIGL SERPL-MCNC: 69 MG/DL

## 2020-11-04 ENCOUNTER — COMMUNICATION - HEALTHEAST (OUTPATIENT)
Dept: INTERNAL MEDICINE | Facility: CLINIC | Age: 85
End: 2020-11-04

## 2020-11-11 ENCOUNTER — RECORDS - HEALTHEAST (OUTPATIENT)
Dept: ADMINISTRATIVE | Facility: OTHER | Age: 85
End: 2020-11-11

## 2020-11-23 ENCOUNTER — RECORDS - HEALTHEAST (OUTPATIENT)
Dept: ADMINISTRATIVE | Facility: OTHER | Age: 85
End: 2020-11-23

## 2020-12-20 ENCOUNTER — NURSE TRIAGE (OUTPATIENT)
Dept: NURSING | Facility: CLINIC | Age: 85
End: 2020-12-20

## 2020-12-20 NOTE — TELEPHONE ENCOUNTER
Patient ate lunch @ 3pm, about 4pm she complained of a bad stomachache and began shaking, can't stop. In bed with 3 blankets on, she is still shaking. T=99.2 and 97 via forehead scanner. /148 P 95. Severe stomach pain located in the bottom right side almost daily for the last 2 weeks.. No bloating noted. Last BM this am. Unable to obtain pain severity information from patient due to aphasia. Shaking stopped during call, she states she is still cold. T rechecked= via forehead 97. BP rechecked = 147/115 P 92 @5pm. She does not take blood pressure medication. Daughter states mother's heart Dr wanted to put her on a diuretic, but she refuses to take it. Hx of stroke 5 yrs ago with right sided weakness, aphasia, apraxia.    Triaged to a disposition of Go to ED now, which daughter intends to do.    Ana Waite RN Triage Nurse Advisor 5:12 PM 12/20/2020    Reason for Disposition    [1] SEVERE pain (e.g., excruciating) AND [2] present > 1 hour    [1] SEVERE pain AND [2] age > 60    [1] Systolic BP  >= 160 OR Diastolic >= 100 AND [2] cardiac or neurologic symptoms (e.g., chest pain, difficulty breathing, unsteady gait, blurred vision)    Additional Information    Negative: Difficult to awaken or acting confused (e.g., disoriented, slurred speech)    Negative: Severe difficulty breathing (e.g., struggling for each breath, speaks in single words)    Negative: [1] Weakness of the face, arm or leg on one side of the body AND [2] new onset    Negative: [1] Numbness (i.e., loss of sensation) of the face, arm or leg on one side of the body AND [2] new onset    Negative: [1] Chest pain lasts > 5 minutes AND [2] history of heart disease  (i.e., heart attack, bypass surgery, angina, angioplasty, CHF)    Negative: [1] Chest pain AND [2] took nitrogylcerin AND [3] pain was not relieved    Negative: Sounds like a life-threatening emergency to the triager    Negative: Symptom is main concern  (e.g., headache, chest pain)     "Negative: Low blood pressure is main concern    Negative: [1] Pregnant > 20 weeks (or postpartum < 6 weeks) AND [2] new hand or face swelling    Negative: [1] Pregnant > 20 weeks AND [2] BP Systolic BP  >= 140 OR Diastolic >= 90    Negative: Shock suspected (e.g., cold/pale/clammy skin, too weak to stand, low BP, rapid pulse)    Negative: Difficult to awaken or acting confused (e.g., disoriented, slurred speech)    Negative: Passed out (i.e., lost consciousness, collapsed and was not responding)    Negative: Sounds like a life-threatening emergency to the triager    Negative: Chest pain    Negative: Pain is mainly in upper abdomen  (if needed ask: \"is it mainly above the belly button?\")    Negative: Followed an abdomen (stomach) injury    Negative: [1] Abdominal pain AND [2] pregnant < 20 weeks    Negative: [1] Abdominal pain AND [2] pregnant > 20 weeks    Negative: [1] Abdominal pain AND [2] postpartum (from 0 to 6 weeks after delivery)    Protocols used: ABDOMINAL PAIN - FEMALE-A-AH, HIGH BLOOD PRESSURE-A-AH    COVID 19 Nurse Triage Plan/Patient Instructions    Please be aware that novel coronavirus (COVID-19) may be circulating in the community. If you develop symptoms such as fever, cough, or SOB or if you have concerns about the presence of another infection including coronavirus (COVID-19), please contact your health care provider or visit [email or web address].     Disposition/Instructions    ED Visit recommended. Follow protocol based instructions.     Bring Your Own Device:  Please also bring your smart device(s) (smart phones, tablets, laptops) and their charging cables for your personal use and to communicate with your care team during your visit.    Thank you for taking steps to prevent the spread of this virus.  o Limit your contact with others.  o Wear a simple mask to cover your cough.  o Wash your hands well and often.    Resources    [facility]: About COVID-19: " [email or web address]    CDC: What to Do If You're Sick: www.cdc.gov/coronavirus/2019-ncov/about/steps-when-sick.html    CDC: Ending Home Isolation: www.cdc.gov/coronavirus/2019-ncov/hcp/disposition-in-home-patients.html     CDC: Caring for Someone: www.cdc.gov/coronavirus/2019-ncov/if-you-are-sick/care-for-someone.html     [facility]: Interim Guidance for Hospital Discharge to Home: www.[facility].mn./diseases/coronavirus/hcp/hospdischarge.pdf    [facility] clinical trials (COVID-19 research studies): [email or web address].[facility]-clinical-trials     Below are the COVID-19 hotlines at the [place] [facility] ([facility]). Interpreters are available.   o For health questions: Call 865-617-1025 or 1-396.825.9716 (7 a.m. to 7 p.m.)  o For questions about schools and childcare: Call 428-088-6145 or 1-139.927.7182 (7 a.m. to 7 p.m.)

## 2020-12-20 NOTE — TELEPHONE ENCOUNTER
2nd call this shift.   Mother's temperature is now =101.3. Daughter would like to have her transported to the ER via ambulance because her mother has difficulty walking. She does not think she can bring her mother by car herself. Daughter intends to call 911 now.     Ana Waite RN Triage Nurse Advisor 5:32 PM 12/20/2020    Additional Information    Negative: Caller is angry or rude (e.g., hangs up, verbally abusive, yelling)    Negative: Caller hangs up    Negative: Caller has already spoken with the PCP and has no further questions.    Negative: Caller has already spoken with another triager and has no further questions.    Caller has already spoken with another triager or PCP AND has further questions AND triager able to answer questions.    Protocols used: NO CONTACT OR DUPLICATE CONTACT CALL-A-

## 2020-12-21 ENCOUNTER — RECORDS - HEALTHEAST (OUTPATIENT)
Dept: ADMINISTRATIVE | Facility: OTHER | Age: 85
End: 2020-12-21

## 2020-12-27 ENCOUNTER — RECORDS - HEALTHEAST (OUTPATIENT)
Dept: ADMINISTRATIVE | Facility: OTHER | Age: 85
End: 2020-12-27

## 2020-12-28 ENCOUNTER — COMMUNICATION - HEALTHEAST (OUTPATIENT)
Dept: FAMILY MEDICINE | Facility: CLINIC | Age: 85
End: 2020-12-28

## 2020-12-29 ENCOUNTER — COMMUNICATION - HEALTHEAST (OUTPATIENT)
Dept: FAMILY MEDICINE | Facility: CLINIC | Age: 85
End: 2020-12-29

## 2021-01-04 ENCOUNTER — RECORDS - HEALTHEAST (OUTPATIENT)
Dept: ADMINISTRATIVE | Facility: OTHER | Age: 86
End: 2021-01-04

## 2021-01-07 ENCOUNTER — COMMUNICATION - HEALTHEAST (OUTPATIENT)
Dept: INTERNAL MEDICINE | Facility: CLINIC | Age: 86
End: 2021-01-07

## 2021-01-07 ENCOUNTER — RECORDS - HEALTHEAST (OUTPATIENT)
Dept: ADMINISTRATIVE | Facility: OTHER | Age: 86
End: 2021-01-07

## 2021-01-07 DIAGNOSIS — R56.9 SEIZURE (H): ICD-10-CM

## 2021-01-07 RX ORDER — LEVETIRACETAM 250 MG/1
250 TABLET ORAL 2 TIMES DAILY
Qty: 180 TABLET | Refills: 2 | Status: SHIPPED | OUTPATIENT
Start: 2021-01-07 | End: 2021-10-15

## 2021-01-08 ENCOUNTER — OFFICE VISIT - HEALTHEAST (OUTPATIENT)
Dept: INTERNAL MEDICINE | Facility: CLINIC | Age: 86
End: 2021-01-08

## 2021-01-08 DIAGNOSIS — K80.50 CHOLEDOCHOLITHIASIS: ICD-10-CM

## 2021-01-08 ASSESSMENT — MIFFLIN-ST. JEOR: SCORE: 924.42

## 2021-01-11 ENCOUNTER — RECORDS - HEALTHEAST (OUTPATIENT)
Dept: ADMINISTRATIVE | Facility: OTHER | Age: 86
End: 2021-01-11

## 2021-01-12 ENCOUNTER — RECORDS - HEALTHEAST (OUTPATIENT)
Dept: ADMINISTRATIVE | Facility: OTHER | Age: 86
End: 2021-01-12

## 2021-01-14 ENCOUNTER — OFFICE VISIT - HEALTHEAST (OUTPATIENT)
Dept: INTERNAL MEDICINE | Facility: CLINIC | Age: 86
End: 2021-01-14

## 2021-01-14 ENCOUNTER — COMMUNICATION - HEALTHEAST (OUTPATIENT)
Dept: SCHEDULING | Facility: CLINIC | Age: 86
End: 2021-01-14

## 2021-01-14 DIAGNOSIS — K90.49 FOOD INTOLERANCE IN ADULT: ICD-10-CM

## 2021-01-18 ENCOUNTER — RECORDS - HEALTHEAST (OUTPATIENT)
Dept: ADMINISTRATIVE | Facility: OTHER | Age: 86
End: 2021-01-18

## 2021-01-28 ENCOUNTER — RECORDS - HEALTHEAST (OUTPATIENT)
Dept: ADMINISTRATIVE | Facility: OTHER | Age: 86
End: 2021-01-28

## 2021-02-01 ENCOUNTER — RECORDS - HEALTHEAST (OUTPATIENT)
Dept: ADMINISTRATIVE | Facility: OTHER | Age: 86
End: 2021-02-01

## 2021-02-02 ENCOUNTER — RECORDS - HEALTHEAST (OUTPATIENT)
Dept: ADMINISTRATIVE | Facility: OTHER | Age: 86
End: 2021-02-02

## 2021-02-08 ENCOUNTER — RECORDS - HEALTHEAST (OUTPATIENT)
Dept: ADMINISTRATIVE | Facility: OTHER | Age: 86
End: 2021-02-08

## 2021-03-04 ENCOUNTER — OFFICE VISIT - HEALTHEAST (OUTPATIENT)
Dept: INTERNAL MEDICINE | Facility: CLINIC | Age: 86
End: 2021-03-04

## 2021-03-04 DIAGNOSIS — K80.50 CHOLEDOCHOLITHIASIS: ICD-10-CM

## 2021-03-04 LAB
ALBUMIN SERPL-MCNC: 3.9 G/DL (ref 3.5–5)
ALBUMIN UR-MCNC: NEGATIVE MG/DL
ALP SERPL-CCNC: 54 U/L (ref 45–120)
ALT SERPL W P-5'-P-CCNC: 12 U/L (ref 0–45)
ANION GAP SERPL CALCULATED.3IONS-SCNC: 8 MMOL/L (ref 5–18)
APPEARANCE UR: CLEAR
AST SERPL W P-5'-P-CCNC: 17 U/L (ref 0–40)
BACTERIA #/AREA URNS HPF: ABNORMAL HPF
BILIRUB SERPL-MCNC: 0.8 MG/DL (ref 0–1)
BILIRUB UR QL STRIP: NEGATIVE
BUN SERPL-MCNC: 13 MG/DL (ref 8–28)
CALCIUM SERPL-MCNC: 9.3 MG/DL (ref 8.5–10.5)
CHLORIDE BLD-SCNC: 104 MMOL/L (ref 98–107)
CO2 SERPL-SCNC: 26 MMOL/L (ref 22–31)
COLOR UR AUTO: YELLOW
CREAT SERPL-MCNC: 0.68 MG/DL (ref 0.6–1.1)
ERYTHROCYTE [DISTWIDTH] IN BLOOD BY AUTOMATED COUNT: 13 % (ref 11–14.5)
GFR SERPL CREATININE-BSD FRML MDRD: >60 ML/MIN/1.73M2
GLUCOSE BLD-MCNC: 95 MG/DL (ref 70–125)
GLUCOSE UR STRIP-MCNC: NEGATIVE MG/DL
HCT VFR BLD AUTO: 34.6 % (ref 35–47)
HGB BLD-MCNC: 11.9 G/DL (ref 12–16)
HGB UR QL STRIP: NEGATIVE
KETONES UR STRIP-MCNC: NEGATIVE MG/DL
LEUKOCYTE ESTERASE UR QL STRIP: ABNORMAL
LIPASE SERPL-CCNC: 21 U/L (ref 0–52)
MCH RBC QN AUTO: 32.2 PG (ref 27–34)
MCHC RBC AUTO-ENTMCNC: 34.4 G/DL (ref 32–36)
MCV RBC AUTO: 94 FL (ref 80–100)
MUCOUS THREADS #/AREA URNS LPF: ABNORMAL LPF
NITRATE UR QL: NEGATIVE
PH UR STRIP: 7 [PH] (ref 5–8)
PLATELET # BLD AUTO: 204 THOU/UL (ref 140–440)
PMV BLD AUTO: 10.4 FL (ref 7–10)
POTASSIUM BLD-SCNC: 4.6 MMOL/L (ref 3.5–5)
PROT SERPL-MCNC: 6.6 G/DL (ref 6–8)
RBC # BLD AUTO: 3.7 MILL/UL (ref 3.8–5.4)
RBC #/AREA URNS AUTO: ABNORMAL HPF
SODIUM SERPL-SCNC: 138 MMOL/L (ref 136–145)
SP GR UR STRIP: 1.01 (ref 1–1.03)
SQUAMOUS #/AREA URNS AUTO: ABNORMAL LPF
UROBILINOGEN UR STRIP-ACNC: ABNORMAL
WBC #/AREA URNS AUTO: ABNORMAL HPF
WBC: 6.8 THOU/UL (ref 4–11)

## 2021-03-04 ASSESSMENT — MIFFLIN-ST. JEOR: SCORE: 919.89

## 2021-03-05 ENCOUNTER — COMMUNICATION - HEALTHEAST (OUTPATIENT)
Dept: INTERNAL MEDICINE | Facility: CLINIC | Age: 86
End: 2021-03-05

## 2021-03-05 LAB — BACTERIA SPEC CULT: NO GROWTH

## 2021-03-09 ENCOUNTER — IMMUNIZATION (OUTPATIENT)
Dept: NURSING | Facility: CLINIC | Age: 86
End: 2021-03-09
Payer: MEDICARE

## 2021-03-09 PROCEDURE — 91303 PR COVID VAC JANSSEN AD26 0.5ML: CPT

## 2021-03-09 PROCEDURE — 0031A PR COVID VAC JANSSEN AD26 0.5ML: CPT

## 2021-05-26 ENCOUNTER — RECORDS - HEALTHEAST (OUTPATIENT)
Dept: ADMINISTRATIVE | Facility: CLINIC | Age: 86
End: 2021-05-26

## 2021-05-27 NOTE — PROGRESS NOTES
Office Visit - Follow up    Nicole Jackson   83 y.o. female    Date of Visit: 4/15/2019    Chief Complaint   Patient presents with     Coronary Artery Disease     Hypertension     Back Pain     lower left side  on and off       Subjective: Hypertension with chronic atrial fibrillation and history of severe stroke left middle cerebral artery with dense paresis right side and marked expressive aphasia.    Low back bothers her on her left side off and on.  She is worried about moles on her abdominal wall these appear to be benign senile angioma cherry red spots daughter accompanies her here today the patient's speech and language function is grossly affected she can say.    But there is not much expressive weakness to this patient.    No blood in stool or urine denies chest pain or shortness of breath on direct questioning.  Took her brace off on her right lower extremity she can walk well she does use a crutch.  Medication list reviewed well-tolerated normal effects.    The patient has an allergy to codeine causing nausea no rash.    ROS: A comprehensive review of systems was performed and was otherwise negative    Medications:  Prior to Admission medications    Medication Sig Start Date End Date Taking? Authorizing Provider   apixaban (ELIQUIS) 5 mg Tab tablet Take 1 tablet (5 mg total) by mouth 2 (two) times a day. 5/12/18  Yes Eusebio Dejesus MD   levETIRAcetam (KEPPRA) 250 MG tablet Take 1 tablet (250 mg total) by mouth 2 (two) times a day. 3/8/19  Yes Eusebio Dejesus MD   lisinopril (PRINIVIL,ZESTRIL) 20 MG tablet Take 1 tablet (20 mg total) by mouth daily. 6/26/17  Yes Eusebio Dejesus MD   metoprolol tartrate (LOPRESSOR) 25 MG tablet TAKE ONE TABLET BY MOUTH TWICE DAILY  7/15/18  Yes Eusebio Dejesus MD   pravastatin (PRAVACHOL) 40 MG tablet Take 40 mg by mouth at bedtime.   Yes PROVIDER, HISTORICAL       Allergies:   Allergies   Allergen Reactions     Codeine Nausea Only       Immunizations:    Immunization History   Administered Date(s) Administered     Influenza, inj, historic,unspecified 09/26/2016     Pneumo Polysac 23-V 06/03/2008       Exam Chest clear to auscultation and percussion.  Heart tones regular rhythm without murmur rub or gallop.  Abdomen soft nontender no organomegaly.  No peritoneal signs.  Extremities free of edema cyanosis or clubbing.  Neck veins nondistended no thyromegaly or scleral icterus noted, carotids full.  Skin warm and dry easily conversant good spirited.  Normal intelligence.  Neurologically intact no gross localizing findings.  Dense paresis right lower extremity right upper extremity and expressive aphasia limits history.    130/66 weight down 1 pound.  BMI ideal at 24.8 old respiratory rate 18 and unlabored pulse 65 irregular O2 sats 99%.    Assessment and Plan  Hypertension with history of atrial fibrillation and thromboembolic complication atrial fibrillation with left middle cerebral artery embolism and status post severe stroke with dense parous his right side and expressive aphasia.  With codeine sensitivity nausea without trish rash.  RTC 3 months time fasting office visit with labs.  Check today lipid panel linked to hypertension and left middle cerebral artery stroke syndrome.  Thromboembolic complication of atrial fibrillation.    Time: total time spent with the patient was 25 minutes of which >50% was spent in counseling and coordination of care    The following high BMI interventions were performed this visit: encouragement to exercise    Eusebio Dejesus MD    Patient Active Problem List   Diagnosis     Cataract     Carpal Tunnel Syndrome     Choledocholithiasis     Cholecystitis     Cerebral infarction due to embolism of middle cerebral artery (H)     Essential hypertension     Atrial fibrillation (H)     Macular degeneration     Primary osteoarthritis involving multiple joints     Chronic low back pain     GERD (gastroesophageal reflux disease)      Breast cancer (H)     IBS (irritable bowel syndrome)     Colon polyp     Acute headache     Vertigo     Seizure (H)

## 2021-05-27 NOTE — PROGRESS NOTES
Attempt 2-Care Guide called patient.  If this patient is returning our call please transfer to [place] at ext. [phone].     Next outreach due: 4/16/19

## 2021-05-28 ENCOUNTER — RECORDS - HEALTHEAST (OUTPATIENT)
Dept: ADMINISTRATIVE | Facility: CLINIC | Age: 86
End: 2021-05-28

## 2021-05-28 NOTE — TELEPHONE ENCOUNTER
Who is calling:  The patients daughter  Reason for Call:  Medical power of  paperwork was not accepted and she had her  look at it yesterday and he didn't know why it wouldn't work.  Could you please have the care guide or  call Lily back with more information about this please.    Date of last appointment with primary care: 04-15-19  Okay to leave a detailed message: Yes

## 2021-05-28 NOTE — PROGRESS NOTES
Care Guide called patient.  If this patient is returning our call please transfer to [place] at ext. [phone].   I have called (patient) 3 times over the past two weeks and have been unsuccessful in reaching them. This patient has also not returned any of my messages.     I will continue attempting to reach out to this patient in one month. I will also check this patient s chart for upcoming appointments, ER reports that may contain a new phone number, or any other recent activity.     Next outreach due: 5/24/19

## 2021-05-29 NOTE — PROGRESS NOTES
Patient lives in her own home. Her daughter Cristy convinced her brother and sister who have POA to remove her from [facility] with the agreement that Cristy would provide her care.  Cristy has a friend that helps out and stays overnight 1-2 nights a week.  Patient's daughter Sylwia stays on Tu, Th and 12 hours one weekend day each week. She also takes her out of the house once a week.  Sylwia would like patient to go back to Assisted Living where she has around the clock access to care givers, meals are provided and she has socialization.   Both daughters will be on vacation (didn't talk before scheduling) in 2 weeks and they are working on finding care during that time.  Sylwia is going to check with [facility] about respite care for the week.     PATIENT IS NOT WEARING HER LIFE ALERT!     Discussed making new goals with RN will schedule at next outreach  Next outreach due: 7/9/19

## 2021-05-29 NOTE — PROGRESS NOTES
Attempt 1-Care Guide called patient.  If this patient is returning our call please transfer to [place] at ext. [phone]    Will move patient to maintenance at next outreach   Next oureach due: 5/31/19

## 2021-05-30 VITALS — HEIGHT: 59 IN | BODY MASS INDEX: 26.41 KG/M2 | WEIGHT: 131 LBS

## 2021-05-30 VITALS — BODY MASS INDEX: 26 KG/M2 | WEIGHT: 129 LBS | HEIGHT: 59 IN

## 2021-05-30 VITALS — HEIGHT: 60 IN | BODY MASS INDEX: 25.32 KG/M2 | WEIGHT: 129 LBS

## 2021-05-30 NOTE — PROGRESS NOTES
Office Visit - Follow up    Nicole Jackson   84 y.o. female    Date of Visit: 7/30/2019    Chief Complaint   Patient presents with     Coronary Artery Disease     Hypertension       Subjective: Stroke syndrome follow-up with history of coronary artery disease and hypertension.    Stroke was secondary to cerebral embolism from atrial fibrillation to left middle cerebral artery.  With dense hemiparesis right side and marked expressive aphasia.  The patient on direct questioning denied chest pain or shortness of breath.    No blood in stool or urine the patient is accompanied by her daughter Lily the patient has not had trouble eating or swallowing.    No complaints in terms of defecation or passing of urine on direct questioning.  She does have a dense paresis with spasticity right side most prominently in the right upper extremity.  There is nasolabial fold depression she is not depressed she is smiling.    ROS: A comprehensive review of systems was performed and was otherwise negative    Medications:  Prior to Admission medications    Medication Sig Start Date End Date Taking? Authorizing Provider   apixaban (ELIQUIS) 5 mg Tab tablet Take 1 tablet (5 mg total) by mouth 2 (two) times a day. 5/12/18  Yes Eusebio Dejesus MD   levETIRAcetam (KEPPRA) 250 MG tablet Take 1 tablet (250 mg total) by mouth 2 (two) times a day. 3/8/19  Yes Eusebio Dejesus MD   lisinopril (PRINIVIL,ZESTRIL) 20 MG tablet Take 1 tablet (20 mg total) by mouth daily. 6/26/17  Yes Eusebio Dejesus MD   metoprolol tartrate (LOPRESSOR) 25 MG tablet TAKE ONE TABLET BY MOUTH TWICE DAILY  7/15/18  Yes Eusebio Dejesus MD   pravastatin (PRAVACHOL) 40 MG tablet Take 40 mg by mouth at bedtime.   Yes PROVIDER, HISTORICAL       Allergies:   Allergies   Allergen Reactions     Codeine Nausea Only       Immunizations:   Immunization History   Administered Date(s) Administered     Influenza, inj, historic,unspecified 09/26/2016     Pneumo  Polysac 23-V 06/03/2008       Exam Chest clear to auscultation and percussion.  Heart tones regular rhythm without murmur rub or gallop.  Abdomen soft nontender no organomegaly.  No peritoneal signs.  Extremities free of edema cyanosis or clubbing.  Neck veins nondistended no thyromegaly or scleral icterus noted, carotids full.  Skin warm and dry easily conversant good spirited.  Normal intelligence.  Neurologically intact no gross localizing findings.  Dense paresis right side with answering questions mostly as.    130/68 pulse 52 regular O2 sats 98% respiratory rate 18 unlabored BMI is ideal at 25.2 weight up 2 pounds from previous 129.  Accompanied by daughter Lily.    Assessment and Plan  Dense cerebrovascular accident involving the right side of the body with marked expressive aphasia secondary to embolism to left middle cerebral artery.  Atrial fibrillation.  Coronary artery disease with history of hypertension and hyperlipidemia.  Lipid panel pending with next office visit in 3 months time fasting.    Recommend salt restriction and diet regular exercise as she is able.    Time: total time spent with the patient was 25 minutes of which >50% was spent in counseling and coordination of care    The following high BMI interventions were performed this visit: encouragement to exercise    Eusebio Dejesus MD    Patient Active Problem List   Diagnosis     Cataract     Carpal Tunnel Syndrome     Choledocholithiasis     Cholecystitis     Cerebral infarction due to embolism of middle cerebral artery (H)     Essential hypertension     Atrial fibrillation (H)     Macular degeneration     Primary osteoarthritis involving multiple joints     Chronic low back pain     GERD (gastroesophageal reflux disease)     Breast cancer (H)     IBS (irritable bowel syndrome)     Colon polyp     Acute headache     Vertigo     Seizure (H)

## 2021-05-30 NOTE — PROGRESS NOTES
[employer] Upgrade    Open Encounter today.  Episodes created, care management status validated and encounters linked    *Move to maintenance or schedule RN Assessment

## 2021-05-31 ENCOUNTER — RECORDS - HEALTHEAST (OUTPATIENT)
Dept: ADMINISTRATIVE | Facility: CLINIC | Age: 86
End: 2021-05-31

## 2021-05-31 VITALS — WEIGHT: 128.12 LBS | BODY MASS INDEX: 25.02 KG/M2

## 2021-05-31 VITALS — WEIGHT: 126 LBS | HEIGHT: 60 IN | BODY MASS INDEX: 24.74 KG/M2

## 2021-05-31 VITALS — WEIGHT: 125 LBS | BODY MASS INDEX: 24.54 KG/M2 | HEIGHT: 60 IN

## 2021-05-31 VITALS — HEIGHT: 60 IN | BODY MASS INDEX: 24.74 KG/M2 | WEIGHT: 126 LBS

## 2021-05-31 VITALS — BODY MASS INDEX: 24.94 KG/M2 | WEIGHT: 127 LBS | HEIGHT: 60 IN

## 2021-05-31 VITALS — BODY MASS INDEX: 24.15 KG/M2 | HEIGHT: 60 IN | WEIGHT: 123 LBS

## 2021-05-31 NOTE — PROGRESS NOTES
Scheduled Follow Up Call: Attempt 3 Community Health Worker called and left a message for the patient. If the patient is returning my call, please transfer the patient to [place] at ext. [phone].   I have called the patient 3 times over the past six weeks, mailed an disenrollment letter today and have been unsuccessful in reaching him/her.  Patient has been disenrolled from Clinic Care coordination services, should they return my call or answer my letter, I will discuss clinic care coordination re-enrollment.

## 2021-05-31 NOTE — PROGRESS NOTES
Scheduled Follow Up Call: Attempt 2 Community Health Worker called and left a message for the patient. If the patient is returning my call, please transfer the patient to [place] at ext. [phone].   I have called the patient 2 times over the past two weeks and have been unsuccessful in reaching him/her.  The patient has not returned any of my messages.                                               I have mailed a letter to the patient requesting a return call and will continue attempting to reach out to the patient in one month. I will also check the patient's chart for upcoming appointments, ER reports that may contain a new phone number, or any other recent activity.    Next outreach due: 8/28/19

## 2021-06-01 ENCOUNTER — RECORDS - HEALTHEAST (OUTPATIENT)
Dept: ADMINISTRATIVE | Facility: CLINIC | Age: 86
End: 2021-06-01

## 2021-06-01 VITALS — BODY MASS INDEX: 24.54 KG/M2 | WEIGHT: 125 LBS | HEIGHT: 60 IN

## 2021-06-01 VITALS — HEIGHT: 60 IN | BODY MASS INDEX: 24.35 KG/M2 | WEIGHT: 124 LBS

## 2021-06-01 VITALS — HEIGHT: 60 IN | BODY MASS INDEX: 24.54 KG/M2 | WEIGHT: 125 LBS

## 2021-06-02 VITALS — HEIGHT: 60 IN | WEIGHT: 128 LBS | BODY MASS INDEX: 25.13 KG/M2

## 2021-06-02 VITALS — WEIGHT: 124 LBS | BODY MASS INDEX: 24.35 KG/M2 | HEIGHT: 60 IN

## 2021-06-02 VITALS — WEIGHT: 128 LBS | HEIGHT: 60 IN | BODY MASS INDEX: 25.13 KG/M2

## 2021-06-02 NOTE — TELEPHONE ENCOUNTER
RN Triage:     Shortness of breath started yesterday after large BM at [facility]. Daughter is not sure if it was black or tarry  Low BP 84/34 P=45 daughter thought it was a bad reading 90/34 P=54  120/44 p=62 sitting. 111/53 p=53   Patient rested over night and then got up to use the bathroom this morning and fell.  This am BP was 121/43 p= 58 legs elevated.   Hurt right wrist.   On blood thinner eliquis.     Advised ED for evaluation and spoke to daughter about 911 for medical transport for safety.     Christianne Fatima RN, BSN Care Connection Triage Nurse            Reason for Disposition    [1] High-risk adult (e.g., age > 60, osteoporosis, chronic steroid use) AND [2] still hurts    Protocols used: HAND AND WRIST INJURY-A-AH

## 2021-06-03 VITALS
OXYGEN SATURATION: 100 % | WEIGHT: 127 LBS | DIASTOLIC BLOOD PRESSURE: 74 MMHG | BODY MASS INDEX: 23.37 KG/M2 | HEART RATE: 62 BPM | HEIGHT: 62 IN | SYSTOLIC BLOOD PRESSURE: 140 MMHG

## 2021-06-03 VITALS — BODY MASS INDEX: 24.94 KG/M2 | WEIGHT: 127 LBS | HEIGHT: 60 IN

## 2021-06-03 VITALS — WEIGHT: 129 LBS | HEIGHT: 60 IN | BODY MASS INDEX: 25.32 KG/M2

## 2021-06-03 NOTE — PROGRESS NOTES
Office Visit - Follow up    Nicole Jackson   84 y.o. female    Date of Visit: 11/7/2019    Chief Complaint   Patient presents with     Hypertension     Atrial Fibrillation     Follow-up     ER   fall        Subjective: Hypertension.    144/70 recheck 140/74.    History of atrial fibrillation complicated by thrombus embolism to the left middle cerebral artery with right-sided paresis.  Recent fall with right Colles' fracture right wrist.  At the [facility] October 24 and October 30, 2019.    The wrist fractures on the right side is in a cast.  Surgery was constipated no surgery done.    No chest pain or shortness of breath.    No blood in stool or urine.    Medication list reviewed well-tolerated.  On a renally adjusted Eliquis dose of 2.5 mg twice daily.    Allergy codeine.  Meds are reviewed reconciled generally well-tolerated.    Accompanied today by her daughter as the patient is a non-smoker he does not use or abuse alcohol.  Allergy only codeine.  Nausea.  No rash.    ROS: A comprehensive review of systems was performed and was otherwise negative    Medications:  Prior to Admission medications    Medication Sig Start Date End Date Taking? Authorizing Provider   apixaban (ELIQUIS) 2.5 mg Tab tablet Take 2.5 mg by mouth 2 (two) times a day.   Yes PROVIDER, HISTORICAL   levETIRAcetam (KEPPRA) 250 MG tablet Take 1 tablet (250 mg total) by mouth 2 (two) times a day. 3/8/19  Yes Eusebio Dejesus MD   lisinopril (PRINIVIL,ZESTRIL) 40 MG tablet Take 40 mg by mouth daily.   Yes PROVIDER, HISTORICAL   metoprolol tartrate (LOPRESSOR) 25 MG tablet Take 25 mg by mouth 2 (two) times a day.   Yes PROVIDER, HISTORICAL   pravastatin (PRAVACHOL) 40 MG tablet Take 40 mg by mouth at bedtime.   Yes PROVIDER, HISTORICAL       Allergies:   Allergies   Allergen Reactions     Codeine Nausea Only       Immunizations:   Immunization History   Administered Date(s) Administered     Influenza, inj, historic,unspecified 09/26/2016      Pneumo Polysac 23-V 06/03/2008       Exam Chest clear to auscultation and percussion.  Heart tones regular rhythm without murmur rub or gallop.  Abdomen soft nontender no organomegaly.  No peritoneal signs.  Extremities free of edema cyanosis or clubbing.  Neck veins nondistended no thyromegaly or scleral icterus noted, carotids full.  Skin warm and dry easily conversant good spirited.  Normal intelligence.  Neurologically intact no gross localizing findings.  Expressive aphasia noted.  144/70 recheck 140/74.  Pulse 62 respirations 18 O2 sats 100%.  Weight down 3 pounds from previous BMI is ideal at 23+.    Assessment and Plan  Hypertension with history of atrial fibrillation and complicating thromboembolic phenomena to the left middle cerebral artery with dense paresis right side and expressive aphasia.  Recent right wrist fracture after fall now casted [facility].  [facility] [place] [facility].  Same meds and cares check lipid panel today linked to hypertension.  Will allow systolic blood pressure readings to rise so as to not compromise cerebral circulation.    Time:         Eusebio Dejesus MD    Patient Active Problem List   Diagnosis     Cataract     Carpal Tunnel Syndrome     Choledocholithiasis     Cholecystitis     Cerebral infarction due to embolism of middle cerebral artery (H)     Essential hypertension     Atrial fibrillation (H)     Macular degeneration     Primary osteoarthritis involving multiple joints     Chronic low back pain     GERD (gastroesophageal reflux disease)     Breast cancer (H)     IBS (irritable bowel syndrome)     Colon polyp     Acute headache     Vertigo     Seizure (H)

## 2021-06-04 ENCOUNTER — OFFICE VISIT - HEALTHEAST (OUTPATIENT)
Dept: INTERNAL MEDICINE | Facility: CLINIC | Age: 86
End: 2021-06-04

## 2021-06-04 DIAGNOSIS — I10 ESSENTIAL HYPERTENSION: ICD-10-CM

## 2021-06-04 LAB
ALBUMIN SERPL-MCNC: 3.9 G/DL (ref 3.5–5)
ALP SERPL-CCNC: 55 U/L (ref 45–120)
ALT SERPL W P-5'-P-CCNC: 12 U/L (ref 0–45)
ANION GAP SERPL CALCULATED.3IONS-SCNC: 11 MMOL/L (ref 5–18)
AST SERPL W P-5'-P-CCNC: 18 U/L (ref 0–40)
BILIRUB SERPL-MCNC: 0.7 MG/DL (ref 0–1)
BUN SERPL-MCNC: 11 MG/DL (ref 8–28)
CALCIUM SERPL-MCNC: 9.2 MG/DL (ref 8.5–10.5)
CHLORIDE BLD-SCNC: 106 MMOL/L (ref 98–107)
CHOLEST SERPL-MCNC: 145 MG/DL
CO2 SERPL-SCNC: 21 MMOL/L (ref 22–31)
CREAT SERPL-MCNC: 0.76 MG/DL (ref 0.6–1.1)
FASTING STATUS PATIENT QL REPORTED: NORMAL
GFR SERPL CREATININE-BSD FRML MDRD: >60 ML/MIN/1.73M2
GLUCOSE BLD-MCNC: 104 MG/DL (ref 70–125)
HDLC SERPL-MCNC: 61 MG/DL
LDLC SERPL CALC-MCNC: 73 MG/DL
POTASSIUM BLD-SCNC: 4.5 MMOL/L (ref 3.5–5)
PROT SERPL-MCNC: 6.7 G/DL (ref 6–8)
SODIUM SERPL-SCNC: 138 MMOL/L (ref 136–145)
TRIGL SERPL-MCNC: 54 MG/DL

## 2021-06-04 ASSESSMENT — MIFFLIN-ST. JEOR: SCORE: 910.81

## 2021-06-05 VITALS
BODY MASS INDEX: 21.16 KG/M2 | TEMPERATURE: 97.4 F | OXYGEN SATURATION: 95 % | HEIGHT: 62 IN | DIASTOLIC BLOOD PRESSURE: 70 MMHG | WEIGHT: 115 LBS | SYSTOLIC BLOOD PRESSURE: 124 MMHG | HEART RATE: 55 BPM

## 2021-06-05 VITALS
SYSTOLIC BLOOD PRESSURE: 120 MMHG | WEIGHT: 116 LBS | TEMPERATURE: 97.2 F | DIASTOLIC BLOOD PRESSURE: 82 MMHG | OXYGEN SATURATION: 96 % | HEIGHT: 62 IN | BODY MASS INDEX: 21.35 KG/M2 | HEART RATE: 77 BPM

## 2021-06-05 VITALS
OXYGEN SATURATION: 100 % | HEART RATE: 70 BPM | WEIGHT: 119 LBS | BODY MASS INDEX: 21.77 KG/M2 | DIASTOLIC BLOOD PRESSURE: 62 MMHG | SYSTOLIC BLOOD PRESSURE: 104 MMHG

## 2021-06-07 ENCOUNTER — COMMUNICATION - HEALTHEAST (OUTPATIENT)
Dept: INTERNAL MEDICINE | Facility: CLINIC | Age: 86
End: 2021-06-07

## 2021-06-08 NOTE — PROGRESS NOTES
Office Visit - Follow up    Nicole Jackson   81 y.o. female    Date of Visit: 1/13/2017    Chief Complaint   Patient presents with     Fall     this am pain right lower arm george wrist     Abdominal Pain     stomach tender       Subjective: Right wrist pain after fall this morning history of stroke with right-sided spasticity and pierces.  Atrial fibrillation secondary to thromboembolic phenomena upper epigastrium area is tender as well A by her daughter she does have expressive aphasia.  Left middle cerebral artery was a distribution of the stroke syndrome.        No blood in stool or urine.  Medication list reviewed to we will tolerated.    ROS: A comprehensive review of systems was performed and was otherwise negative    Medications:  Prior to Admission medications    Medication Sig Start Date End Date Taking? Authorizing Provider   apixaban (ELIQUIS) 5 mg Tab tablet Take 1 tablet (5 mg total) by mouth 2 (two) times a day. 5/2/16  Yes Eusebio Dejesus MD   levETIRAcetam (KEPPRA) 250 MG tablet Take 1 tablet (250 mg total) by mouth 2 (two) times a day. 9/26/16  Yes Aram Whitaker MD   metoprolol tartrate (LOPRESSOR) 25 MG tablet Take 12.5 mg by mouth daily. Take 1/2 tablet daily 9/3/15  Yes Eusebio Dejesus MD   simvastatin (ZOCOR) 20 MG tablet TAKE 1 TABLET (20 MG TOTAL) BY MOUTH BEDTIME.  Patient taking differently: Take 20 mg by mouth bedtime. TAKE 1 TABLET (20 MG TOTAL) BY MOUTH BEDTIME. 7/12/16  Yes Eusebio Dejesus MD       Allergies:   Allergies   Allergen Reactions     Codeine Nausea Only       Immunizations:   Immunization History   Administered Date(s) Administered     Influenza, inj, historic 09/26/2016     Pneumo Polysac 23-V 06/03/2008       Exam Chest clear to auscultation and percussion.  Heart tones regular rhythm without murmur rub or gallop.  Abdomen soft nontender no organomegaly.  No peritoneal signs.  Extremities free of edema cyanosis or clubbing.  Neck veins nondistended no  thyromegaly or scleral icterus noted, carotids full.  Skin warm and dry easily conversant good spirited.  Normal intelligence.  Neurologically intact no gross localizing findings.  Tender dorsum right wrist.  X-ray pending    Assessment and Plan   Right wrist pain after fall stroke syndrome.  Atrial fibrillation with thromboembolic phenomena check x-ray of right wrist elevate wrapped ice and somewhat orthopedic consultation if fractured.  Otherwise ice elevation Ace wrap.  Immobilization.  RTC 1 month.    Stroke syndrome with right-sided pierces spasticity.  Upper and lower extremity on the right side accompanied by daughter.    Time: total time spent with the patient was 25 minutes of which >50% was spent in counseling and coordination of care    The following high BMI interventions were performed this visit: encouragement to exercise    Eusebio Dejesus MD    Patient Active Problem List   Diagnosis     Cataract     Carpal Tunnel Syndrome     Choledocholithiasis     Cholecystitis     Cerebral infarction due to embolism of middle cerebral artery     Essential hypertension     Atrial fibrillation     Macular degeneration     Primary osteoarthritis involving multiple joints     Chronic low back pain     GERD (gastroesophageal reflux disease)     Breast cancer     IBS (irritable bowel syndrome)     Colon polyp     Acute headache     Vertigo     Seizure

## 2021-06-09 NOTE — TELEPHONE ENCOUNTER
Triage call:    Kathie - daughter is the call- consent on file  Has been socially distancing since COVID started    Patients son came to town- did not social distance and did not wear a mask - left this morning at 6 am- was in 3 different air ports in three days. Concerned that he put patient at risk. Advised that she needs to talk to her brother about her concerns with his actions- she states that she will      Daughter is the main contact and stays with her mother- she is concerned about symptoms to watch for as well as what to clean in the home. Patient has no symptoms today.     COVID 19 Nurse Triage Plan/Patient Instructions    Please be aware that novel coronavirus (COVID-19) may be circulating in the community. If you develop symptoms such as fever, cough, or SOB or if you have concerns about the presence of another infection including coronavirus (COVID-19), please contact your health care provider or visit [email or web address].     Disposition/Instructions    Additional COVID19 information to add for patients.   How can I protect others?  If you have symptoms (fever, cough, body aches or trouble breathing): Stay home and away from others (self-isolate) until:    At least 10 days have passed since your symptoms started. And     You ve had no fever--and no medicine that reduces fever--for 3 full days (72 hours). And      Your other symptoms have resolved (gotten better).     If you don t have symptoms, but a test showed that you have COVID-19 (you tested positive):    Stay home and away from others (self-isolate) until at least 10 days have passed since the date of your first positive COVID-19 test.    During this time:    Stay in your own room, even for meals. Use your own bathroom if you can.     Stay away from others in your home. No hugging, kissing or shaking hands. No visitors.    Don t go to work, school or anywhere else.     Clean  high touch  surfaces often (doorknobs, counters, handles, etc.). Use a  household cleaning spray or wipes. You ll find a full list on the EPA website:  www.epa.gov/pesticide-registration/list-n-disinfectants-use-against-sars-cov-2.    Cover your mouth and nose with a mask, tissue or washcloth to avoid spreading germs.    Wash your hands and face often. Use soap and water.    Caregivers in these groups are at risk for severe illness due to COVID-19:  o People 65 years and older  o People who live in a nursing home or long-term care facility  o People with chronic disease (lung, heart, cancer, diabetes, kidney, liver, immunologic)  o People who have a weakened immune system, including those who:  - Are in cancer treatment  - Take medicine that weakens the immune system, such as corticosteroids  - Had a bone marrow or organ transplant  - Have an immune deficiency  - Have poorly controlled HIV or AIDS  - Are obese (body mass index of 40 or higher)  - Smoke regularly    Caregivers should wear gloves while washing dishes, handling laundry and cleaning bedrooms and bathrooms.    Use caution when washing and drying laundry: Don t shake dirty laundry, and use the warmest water setting that you can.    For more tips, go to www.cdc.gov/coronavirus/2019-ncov/downloads/10Things.pdf.    How can I take care of myself?  1. Get lots of rest. Drink extra fluids (unless a doctor has told you not to).     2. Take Tylenol (acetaminophen) for fever or pain. If you have liver or kidney problems, ask your family doctor if it s okay to take Tylenol.     Adults can take either:     650 mg (two 325 mg pills) every 4 to 6 hours, or     1,000 mg (two 500 mg pills) every 8 hours as needed.     Note: Don t take more than 3,000 mg in one day.   Acetaminophen is found in many medicines (both prescribed and over-the-counter medicines). Read all labels to be sure you don t take too much.     For children, check the Tylenol bottle for the right dose. The dose is based on the child s age or weight.    3. If you have other  health problems (like cancer, heart failure, an organ transplant or severe kidney disease): Call your specialty clinic if you don t feel better in the next 2 days.    4. Know when to call 911: Emergency warning signs include:    Trouble breathing or shortness of breath    Pain or pressure in the chest that doesn t go away    Feeling confused like you haven t felt before, or not being able to wake up    Bluish-colored lips or face    What are the symptoms of COVID-19?     The most common symptoms are cough, fever and trouble breathing.     Less common symptoms include body aches, chills, diarrhea (loose, watery poops), fatigue (feeling very tired), headache, runny nose, sore throat and loss of smell.    COVID-19 can cause severe coughing (bronchitis) and lung infection (pneumonia).    How does it spread?     The virus may spread when a person coughs or sneezes into the air. The virus can travel about 6 feet this way, and it can live on surfaces.      Common  (household disinfectants) will kill the virus.    Who is at risk?  Anyone can catch COVID-19 if they re around someone who has the virus.    How can others protect themselves?     Stay away from people who have COVID-19 (or symptoms of COVID-19).    Wash hands often with soap and water. Or, use hand  with at least 60% alcohol.    Avoid touching the eyes, nose or mouth.     Wear a face mask when you go out in public, when sick or when caring for a sick person.    Where can I get more information?    [facility]: About COVID-19: [email or web address]    CDC: What to Do If You re Sick: www.cdc.gov/coronavirus/2019-ncov/about/steps-when-sick.html    CDC: Ending Home Isolation: www.cdc.gov/coronavirus/2019-ncov/hcp/disposition-in-home-patients.html     CDC: Caring for Someone: www.cdc.gov/coronavirus/2019-ncov/if-you-are-sick/care-for-someone.html    MD: Interim Guidance for Hospital Discharge to Home:  [email or web address]    [facility] clinical trials (COVID-19 research studies): [email or web address]-clinical-[email or web address]     Below are the COVID-19 hotlines at the [place] Department of Health ([facility]). Interpreters are available.   o For health questions: Call 955-221-2007 or 1-138.656.6969 (7 a.m. to 7 p.m.)  o For questions about schools and childcare: Call 257-565-1539 or 1-758.747.5416 (7 a.m. to 7 p.m.)            Thank you for taking steps to prevent the spread of this virus.  o Limit your contact with others.  o Wear a simple mask to cover your cough.  o Wash your hands well and often.    [facility]: About COVID-19: [email or web address]    CDC: What to Do If You're Sick: www.cdc.gov/coronavirus/2019-ncov/about/steps-when-sick.html    CDC: Ending Home Isolation: www.cdc.gov/coronavirus/2019-ncov/hcp/disposition-in-home-patients.html     CDC: Caring for Someone: www.cdc.gov/coronavirus/2019-ncov/if-you-are-sick/care-for-someone.html     [facility]: Interim Guidance for Hospital Discharge to Home: [email or web address]/[email or web address]/hospdischarge.pdf    [facility] clinical trials (COVID-19 research studies): [email or web address]-clinical-trials     Below are the COVID-19 hotlines at the [place] Department of [facility] ([facility]). Interpreters are available.   o For health questions: Call 474-134-2362 or 1-795.248.3012 (7 a.m. to 7 p.m.)  o For questions about schools and childcare: Call 315-973-7521 or 1-511.839.3128 (7 a.m. to 7 p.m.)         Reason for Disposition    [1] Caller concerned that exposure to COVID-19 occurred BUT [2] does not meet COVID-19 EXPOSURE criteria from CDC    Additional Information    Negative: COVID-19 has been diagnosed by a healthcare provider (HCP)    Negative: COVID-19 lab test positive    Negative: [1] Symptoms of COVID-19 (e.g., cough, fever, SOB, or others) AND [2]  lives in an area with community spread    Negative: [1] Symptoms of COVID-19 (e.g., cough, fever, SOB, or others) AND [2] within 14 days of EXPOSURE (close contact) with diagnosed or suspected COVID-19 patient    Negative: [1] Symptoms of COVID-19 (e.g., cough, fever, SOB, or others) AND [2] within 14 days of travel from high-risk area for COVID-19 community spread (identified by CDC)    Negative: [1] Difficulty breathing (shortness of breath) occurs AND [2] onset > 14 days after COVID-19 EXPOSURE (Close Contact) AND [3] no community spread where patient lives    Negative: [1] Dry cough occurs AND [2] onset > 14 days after COVID-19 EXPOSURE AND [3] no community spread where patient lives    Negative: [1] Wet cough (i.e., white-yellow, yellow, green, or augustus colored sputum) AND [2] onset > 14 days after COVID-19 EXPOSURE AND [3] no community spread where patient lives    Negative: [1] Common cold symptoms AND [2] onset > 14 days after COVID-19 EXPOSURE AND [3] no community spread where patient lives    Negative: [1] COVID-19 EXPOSURE (Close Contact) within last 14 days AND [2] needs COVID-19 lab test to return to work AND [3] NO symptoms    Negative: [1] COVID-19 EXPOSURE (Close Contact) within last 14 days AND [2] exposed person is a healthcare worker who was NOT using all recommended personal protective equipment (i.e., a respirator-N95 mask, eye protection, gloves, and gown) AND [3] NO symptoms    Negative: [1] COVID-19 EXPOSURE (Close Contact) AND [2] within last 14 days BUT [3] NO symptoms    Negative: [1] COVID-19 EXPOSURE AND [2] 15 or more days ago AND [3] NO symptoms    Negative: [1] Living in area with community spread (identified by local PHD) BUT [2] NO symptoms    Negative: [1] Travel from area with community spread (identified by CDC) AND [2] within last 14 days BUT [3] NO symptoms    Negative: [1] No COVID-19 EXPOSURE BUT [2] living with someone who was exposed and who has no symptoms of  COVID-19    Protocols used: CORONAVIRUS (COVID-19) EXPOSURE-A- 5.16.20

## 2021-06-10 NOTE — PROGRESS NOTES
Office Visit - Follow up    Nicole Jackson   81 y.o. female    Date of Visit: 4/24/2017    Chief Complaint   Patient presents with     Hypertension     Atrial Fibrillation     stroke syndrome     Stye     right lower lid       Subjective: Stroke secondary to embolism to the middle cerebral artery on the left side.    Sties involving lower lids.  TobraDex drops to be prescribed.  Hypertension and history of atrial fibrillation.    No blood in stool or urine patient does have expressive aphasia secondary to stroke involving the middle cerebral artery on the left side right-hand-dominant patient is accompanied by her daughter she has considerable weakness parous cyst and spasticity along the right side of her body.  She is not able to verbalize any complete words or sentences but says and 9.  Cognitive aphasia seems to be not present mainly expressive.    No blood in stool or urine.  Medication list reviewed well-tolerated normal effects.    ROS: A comprehensive review of systems was performed and was otherwise negative    Medications:  Prior to Admission medications    Medication Sig Start Date End Date Taking? Authorizing Provider   apixaban (ELIQUIS) 5 mg Tab tablet Take 1 tablet (5 mg total) by mouth 2 (two) times a day. 4/24/17  Yes Eusebio Dejesus MD   levETIRAcetam (KEPPRA) 250 MG tablet Take 1 tablet (250 mg total) by mouth 2 (two) times a day. 3/20/17  Yes Eusebio Dejesus MD   metoprolol tartrate (LOPRESSOR) 25 MG tablet Take 0.5 tablets (12.5 mg total) by mouth daily. Take 1/2 tablet daily 1/31/17  Yes Eusebio Dejesus MD   simvastatin (ZOCOR) 20 MG tablet TAKE 1 TABLET (20 MG TOTAL) BY MOUTH BEDTIME.  Patient taking differently: Take 20 mg by mouth bedtime. TAKE 1 TABLET (20 MG TOTAL) BY MOUTH BEDTIME. 7/12/16  Yes Eusebio Dejesus MD   apixaban (ELIQUIS) 5 mg Tab tablet Take 1 tablet (5 mg total) by mouth 2 (two) times a day. 5/2/16 4/24/17 Yes Eusebio Dejesus MD       Allergies:    Allergies   Allergen Reactions     Codeine Nausea Only       Immunizations:   Immunization History   Administered Date(s) Administered     Influenza, inj, historic 09/26/2016     Pneumo Polysac 23-V 06/03/2008       Exam considerable spasticity parous cyst right side of body chest clear.  Heart tones regular rhythm abdomen benign extremities showed some hammertoe deformity right foot neck veins nondistended prior Botox injections to hammertoe deformity right foot ineffective.  Suggest podiatry consultation with Dr. Clive Sutherland.    Assessment and Plan  Stroke involving embolism to middle cerebral artery on left side right-hand-dominant.  Refill Eliquis as requested 5 mg twice daily RTC in 2 months time.  Check lipid panel today.    Hammertoe deformity right foot suggest Dr. Clive Sutherland to see.    TobraDex eyedrops for styes and blepharitis.    Time: total time spent with the patient was 25 minutes of which >50% was spent in counseling and coordination of care    The following high BMI interventions were performed this visit: encouragement to exercise    Eusebio Dejesus MD    Patient Active Problem List   Diagnosis     Cataract     Carpal Tunnel Syndrome     Choledocholithiasis     Cholecystitis     Cerebral infarction due to embolism of middle cerebral artery     Essential hypertension     Atrial fibrillation     Macular degeneration     Primary osteoarthritis involving multiple joints     Chronic low back pain     GERD (gastroesophageal reflux disease)     Breast cancer     IBS (irritable bowel syndrome)     Colon polyp     Acute headache     Vertigo     Seizure

## 2021-06-10 NOTE — PROGRESS NOTES
Admission History & Physical  Nicole Jackson, 1935, [address]    [facility] [address]  Eusebio Dejesus MD, [phone]    Extended Emergency Contact Information  Primary Emergency Contact: Sam Jackson   [facility]  Home Phone: [phone]  Relation: Child  Secondary Emergency Contact: Sylwia Short   [facility]  Home Phone: [phone]  Mobile Phone: [phone]  Relation: Child     Assessment and Plan:   Assessment: Hammertoe second and third toe right foot  Plan: I have recommended shoe modification  Active Problems:    * No active hospital problems. *      Chief Complaint:  MI toe right foot      HPI:    Nicole Jackson is a 81 y.o. old female who presented to the clinic today along with her family complaining of hammertoes primarily in involving the right foot.  The patient had a CVA approximately 1 year ago.  She has muscle weakness on the right side.  She also has muscle contractures right lower extremity.  She is currently receiving Botox injections to control these contractures.  Her son indicated that the Botox does give her some temporary relief.  The patient stated that she does have some pain primarily on the top of her foot when wearing normal shoes.  She has difficult time wearing her shoes with her AFO.  She has no other complaints.  History is provided by patient    Medical History  Active Ambulatory (Non-Hospital) Problems    Diagnosis     Seizure     Acute headache     Vertigo     Atrial fibrillation     Macular degeneration     Primary osteoarthritis involving multiple joints     Chronic low back pain     GERD (gastroesophageal reflux disease)     Breast cancer     IBS (irritable bowel syndrome)     Colon polyp     Cerebral infarction due to embolism of middle cerebral artery     Essential hypertension     Cholecystitis     Choledocholithiasis     Cataract     Carpal Tunnel Syndrome     Past Medical History:   Diagnosis Date     Atrial fibrillation       Breast cancer      Carpal tunnel syndrome      Cerebral infarction due to embolism of middle cerebral artery 12/13/2015     Chronic low back pain      Colon polyp      GERD (gastroesophageal reflux disease)      Hypertension      IBS (irritable bowel syndrome)      Lumbago      Macular degeneration      Osteoarthritis      Pancreatitis due to common bile duct stone      Thrombophlebitis of popliteal vein 2010     Patient Active Problem List    Diagnosis Date Noted     Seizure 09/24/2016     Acute headache 06/20/2016     Vertigo 06/20/2016     Atrial fibrillation      Macular degeneration      Primary osteoarthritis involving multiple joints      Chronic low back pain      GERD (gastroesophageal reflux disease)      Breast cancer      IBS (irritable bowel syndrome)      Colon polyp      Cerebral infarction due to embolism of middle cerebral artery 12/13/2015     Essential hypertension 12/13/2015     Cholecystitis 06/12/2015     Choledocholithiasis      Cataract      Carpal Tunnel Syndrome      Surgical History  She  has a past surgical history that includes Breast lumpectomy; Abdominal surgery (history of esophageal surgery-); Colonoscopy; ERCP (N/A, 5/11/2015); Elbow surgery (Right); Eye surgery (Bilateral); Hysterectomy; Laparoscopic cholecystectomy (N/A, 6/16/2015); Gastrojejunostomy w/ jejunostomy tube (12/16/15); Joint replacement (Bilateral); and Joint replacement (Left).   Past Surgical History:   Procedure Laterality Date     ABDOMINAL SURGERY  history of esophageal surgery-     BREAST LUMPECTOMY       COLONOSCOPY       ELBOW SURGERY Right      ERCP N/A 5/11/2015    Procedure: ENDOSCOPIC RETROGRADE CHOLANGIOPANCREATOGRAPHY with ERS and Stone removal;  Surgeon: Michael Paulson MD;  Location: [facility];  Service:      EYE SURGERY Bilateral     cataract     GASTROJEJUNOSTOMY W/ JEJUNOSTOMY TUBE  12/16/15     HYSTERECTOMY       JOINT REPLACEMENT Bilateral     knees     JOINT REPLACEMENT Left     hip      LAPAROSCOPIC CHOLECYSTECTOMY N/A 6/16/2015    Procedure: LAPAROSCOPIC CHOLECYSTECTOMY ;  Surgeon: Arpit Salinas MD;  Location: [facility];  Service:     Social History  Reviewed, and she  reports that she quit smoking about 38 years ago. She does not have any smokeless tobacco history on file. She reports that she drinks alcohol. She reports that she does not use illicit drugs.  Social History   Substance Use Topics     Smoking status: Former Smoker     Quit date: 6/15/1978     Smokeless tobacco: Not on file      Comment: stopped 30 years ago     Alcohol use Yes      Comment: seldom      Allergies  Allergies   Allergen Reactions     Codeine Nausea Only    Family History  Reviewed, and family history includes Cancer in her brother; Colon cancer in her mother; Esophageal cancer in her father.   Psychosocial Needs  Social History     Social History Narrative    [place] until CV[place] 12/22/15     Additional psychosocial needs reviewed per nursing assessment.       Prior to Admission Medications     (Not in a hospital admission)        Review of Systems - Negative     /56  Pulse (!) 59  SpO2 99%      Objective findings: General: The patient is alert and in no acute distress    Integument: Nails bilateral feet normal length and color.  Skin bilateral feet warm and intact.    Vascular: DP and PT pulses +1/4 bilateral feet.  Capillary refill less than 2 seconds bilateral feet.    Neurologic: Negative clonus, negative Babinski bilaterally    Musculoskeletal: Range of motion within normal limits bilaterally.  Muscle power +3/5 in all compartments left lower extremity.  And +3/5 right lower extremity in all compartments with the exception of the anterior muscle compartment which is +1/5.  There is mild contractures noted at the proximal interphalangeal joint second and third toe right foot.    Assessment: Hammertoe second third toe right foot    Plan: I recommended shoe modification to accommodate the patient's  foot and her AFO.  She is to return to the clinic as needed.

## 2021-06-11 NOTE — PROGRESS NOTES
Office Visit - Follow up    Nicole Jackson   81 y.o. 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  Atrial fibrillation.  Now on Eliquis.    Recent hospital stay at [facility] when she was found down the patient was noted to have elevated cardiac enzymes secondary to demand ischemia and possible seizure.  Keppra dose increased from 250 mg twice daily to 375 mg twice daily elevated liver function tests possibly due to a passed biliary stone discussed currently asymptomatic in this regard denies abdominal pain fever nausea jaundice or yolande colored stools or acholic colored urine.    Allergies include codeine med list reviewed well-tolerated no blood in stool or urine no chest pain or shortness of breath marked expressive aphasia with right-sided paresis spasticity limits exam daughter present very supportive.    ROS: A comprehensive review of systems was performed and was otherwise negative    Medications:  Prior to Admission medications    Medication Sig Start Date End Date Taking? Authorizing Provider   apixaban (ELIQUIS) 5 mg Tab tablet Take 1 tablet (5 mg total) by mouth 2 (two) times a day. 4/24/17  Yes Eusebio Dejesus MD   levETIRAcetam (KEPPRA) 250 MG tablet Take 1 tablet (250 mg total) by mouth 2 (two) times a day. 3/20/17  Yes Eusebio Dejesus MD   lisinopril (PRINIVIL,ZESTRIL) 20 MG tablet Take 1 tablet (20 mg total) by mouth daily. 6/26/17  Yes Eusebio Dejesus MD   metoprolol tartrate (LOPRESSOR) 25 MG tablet Take 0.5 tablets (12.5 mg total) by mouth daily. Take 1/2 tablet daily 6/26/17  Yes Eusebio Dejesus MD   simvastatin (ZOCOR) 20 MG tablet TAKE 1 TABLET (20 MG TOTAL) BY MOUTH BEDTIME.  Patient taking differently: Take 20 mg by mouth bedtime. TAKE 1 TABLET (20 MG TOTAL) BY  MOUTH BEDTIME. 7/12/16  Yes Eusebio Dejesus MD   lisinopril (PRINIVIL,ZESTRIL) 20 MG tablet Take 20 mg by mouth.  6/26/17  PROVIDER, HISTORICAL   metoprolol tartrate (LOPRESSOR) 25 MG tablet Take 0.5 tablets (12.5 mg total) by mouth daily. Take 1/2 tablet daily 1/31/17 6/26/17  Eusebio Dejesus MD       Allergies:   Allergies   Allergen Reactions     Codeine Nausea Only       Immunizations:   Immunization History   Administered Date(s) Administered     Influenza, inj, historic 09/26/2016     Pneumo Polysac 23-V 06/03/2008       Exam dense parous cyst involving right side with spasticity expressive aphasia is marketed limiting history chest clear heart tones regular rhythm abdomen benign extremities free of edema neck veins nondistended no thyromegaly or scleral icterus.    Assessment and Plan  CVA secondary to obstructed left middle cerebral artery from atrial fibrillation and thromboembolic event.  No on Eliquis.  Check lipid panel plus Keppra level.    Seizure disorder.    Codeine allergy.    Hypertension and hyperlipidemia.  RTC 1 month.    Time: total time spent with the patient was 25 minutes of which >50% was spent in counseling and coordination of care    The following high BMI interventions were performed this visit: encouragement to exercise    Eusebio Dejesus MD    Patient Active Problem List   Diagnosis     Cataract     Carpal Tunnel Syndrome     Choledocholithiasis     Cholecystitis     Cerebral infarction due to embolism of middle cerebral artery     Essential hypertension     Atrial fibrillation     Macular degeneration     Primary osteoarthritis involving multiple joints     Chronic low back pain     GERD (gastroesophageal reflux disease)     Breast cancer     IBS (irritable bowel syndrome)     Colon polyp     Acute headache     Vertigo     Seizure

## 2021-06-12 NOTE — PROGRESS NOTES
Office Visit - Follow up    Nicole Jackson   85 y.o. female    Date of Visit: 11/3/2020    Chief Complaint   Patient presents with     Follow-up     fasting, declines flu shot        Subjective: Cerebral infarction due to thromboembolic phenomena from atrial fibrillation left middle cerebral artery.    Chest congestion.    Medication list reviewed reconciled.    No blood in stool or urine.    Medication list reviewed generally well-tolerated without side effects.    Denies chest pain she does have some chest congestion.  Accompanied by her daughter.  The patient has marked expressive aphasia problem previous CVA involving the left middle cerebral artery.  She is right-hand dominant.  She has a dense hemiparesis with some spasticity on the right.  Uses a cane for ambulation no recent falls.    ROS: A comprehensive review of systems was performed and was otherwise negative    Medications:  Prior to Admission medications    Medication Sig Start Date End Date Taking? Authorizing Provider   apixaban ANTICOAGULANT (ELIQUIS) 2.5 mg Tab tablet Take 1 tablet (2.5 mg total) by mouth 2 (two) times a day. 11/3/20  Yes Eusebio Dejesus MD   levETIRAcetam (KEPPRA) 250 MG tablet TAKE 1 TABLET (250 MG TOTAL) BY MOUTH 2 (TWO) TIMES A DAY. 1/7/20  Yes Eusebio Dejesus MD   lisinopril (PRINIVIL,ZESTRIL) 40 MG tablet Take 40 mg by mouth daily.   Yes PROVIDER, HISTORICAL   metoprolol tartrate (LOPRESSOR) 25 MG tablet Take 25 mg by mouth 2 (two) times a day.   Yes PROVIDER, HISTORICAL   pravastatin (PRAVACHOL) 40 MG tablet Take 40 mg by mouth at bedtime.   Yes PROVIDER, HISTORICAL   apixaban (ELIQUIS) 2.5 mg Tab tablet Take 2.5 mg by mouth 2 (two) times a day.  11/3/20 Yes PROVIDER, HISTORICAL       Allergies:   Allergies   Allergen Reactions     Codeine Nausea Only       Immunizations:   Immunization History   Administered Date(s) Administered     Influenza, inj, historic,unspecified 09/26/2016     Pneumo Polysac 23-V 06/03/2008      Td, adult adsorbed, PF 03/10/1998     ZOSTER, LIVE 02/03/2011       Exam Chest clear to auscultation and percussion.  Heart tones regular rhythm without murmur rub or gallop.  Abdomen soft nontender no organomegaly.  No peritoneal signs.  Extremities free of edema cyanosis or clubbing.  Neck veins nondistended no thyromegaly or scleral icterus noted, carotids full.  Skin warm and dry easily conversant good spirited.  Normal intelligence.  Neurologically intact no gross localizing findings.    Assessment and Plan  Cerebral infarction due to thromboembolic complication of atrial fibrillation involving left middle cerebral artery check lipid panel plus comprehensive metabolic profile chest x-ray today.  Chest congestion.  Rule out aspiration.    Codeine allergy.    Hypertension controlled.  104/62 no recent falls.  No COVID-19 exposure.  No recent seizures continue Keppra prophylactic.  Meds and cares same.  Refill Eliquis 2.5 mg tablets twice daily.  RTC 4 months time.    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

## 2021-06-12 NOTE — PROGRESS NOTES
Office Visit - Follow up    Nicole Jackson   82 y.o. female    Date of Visit: 9/5/2017    Chief Complaint   Patient presents with     Hypertension     Hyperlipidemia     Nevus     left arm and back       Subjective: CVA due to thrombosis from embolic phenomena left middle cerebral artery.  Atrial fibrillation.  Lipid panel pending history of hypertension.  Skin lesions noted left arm and back appear to be seborrheic age-related keratoses but would suggest dermatology consultation with dermatology consultant Dr. FOWLER.    No blood in stool or urine no chest pain or shortness of breath.  Patient has severe expressive aphasia and this limits the history taking.  Daughter is here supported.  There is a dense paralysis and spasticity noted in the right upper extremity right lower extremity.    ROS: A comprehensive review of systems was performed and was otherwise negative    Medications:  Prior to Admission medications    Medication Sig Start Date End Date Taking? Authorizing Provider   apixaban (ELIQUIS) 5 mg Tab tablet Take 1 tablet (5 mg total) by mouth 2 (two) times a day. 4/24/17  Yes Eusebio Dejesus MD   levETIRAcetam (KEPPRA) 250 MG tablet Take 1 tablet (250 mg total) by mouth 2 (two) times a day. 3/20/17  Yes Eusebio Dejesus MD   lisinopril (PRINIVIL,ZESTRIL) 20 MG tablet Take 1 tablet (20 mg total) by mouth daily. 6/26/17  Yes Eusebio Dejesus MD   metoprolol tartrate (LOPRESSOR) 25 MG tablet Take 1 tablet (25 mg total) by mouth 2 (two) times a day. 7/19/17 7/19/18 Yes Eusebio Dejesus MD   simvastatin (ZOCOR) 20 MG tablet TAKE 1 TABLET BY MOUTH EVERYNIGHT AT BEDTIME 7/16/17  Yes Eusebio Dejesus MD       Allergies:   Allergies   Allergen Reactions     Codeine Nausea Only       Immunizations:   Immunization History   Administered Date(s) Administered     Influenza, inj, historic 09/26/2016     Pneumo Polysac 23-V 06/03/2008       Exam dense paresis with spasticity right upper extremity right lower  extremity.  Chest clear heart tones normal regular rhythm abdomen benign extremities free of edema neck veins nondistended neatly attired easily conversant back living at home.  Rhythm is regular.  Blood pressure pulse regular respirations 18 and unlabored.    Assessment and Plan  CVA secondary to thromboembolic phenomena with underlying atrial fibrillation left middle cerebral artery distribution with dense parous cyst and spasticity right upper extremity right lower extremity.  Daughter here supportive.  Back living at home without incident check lipid panel today.    Codeine allergy.    Hypertension controlled.  RTC 2 months.    Time: total time spent with the patient was 25 minutes of which >50% was spent in counseling and coordination of care        Eusebio Dejesus MD    Patient Active Problem List   Diagnosis     Cataract     Carpal Tunnel Syndrome     Choledocholithiasis     Cholecystitis     Cerebral infarction due to embolism of middle cerebral artery     Essential hypertension     Atrial fibrillation     Macular degeneration     Primary osteoarthritis involving multiple joints     Chronic low back pain     GERD (gastroesophageal reflux disease)     Breast cancer     IBS (irritable bowel syndrome)     Colon polyp     Acute headache     Vertigo     Seizure

## 2021-06-12 NOTE — TELEPHONE ENCOUNTER
Who is calling:  aKthie, patient's daughter  Reason for Call:  Nicole has an appointment with Dr. Dejesus on 11/3/20.  Kathie would like to speak to Maryjane, Dr. Dejesus's CA, regarding some concerns she has in regards to Nicole.  She would like to discuss them prior to the appointment and have Dr. Dejesus approach Nciole on these concerns.  Date of last appointment with primary care: 11/7/19  Okay to leave a detailed message: Yes

## 2021-06-12 NOTE — TELEPHONE ENCOUNTER
Please asked my medical assistant PIERRE today sign to call the patient's daughter and see what this is about the need for me.

## 2021-06-12 NOTE — PROGRESS NOTES
Office Visit - Follow up    Nicole Jackson   82 y.o. female    Date of Visit: 7/31/2017    Chief Complaint   Patient presents with     Hypertension     Atrial Fibrillation     Follow-up     CVA       Subjective: CVA due to embolism to left middle cerebral artery with right-sided paresis and expressive aphasia.  History of atrial fibrillation and thromboembolic phenomena to left middle cerebral artery.  One-month follow-up.  Blood pressure has been labile.  Last month blood testing was done for lipid panel.  Keppra level should be done with next visit.  Allergy codeine.  Feels well offers no new complaints.  Denies chest pain or shortness of breath no blood in stool or urine.  She does have expressive aphasia history is limited.  She is accompanied today by daughter and son.    No blood in stool or urine no chest pain shortness of breath no headache.    Medication list reviewed generally well-tolerated.    ROS: A comprehensive review of systems was performed and was otherwise negative    Medications:  Prior to Admission medications    Medication Sig Start Date End Date Taking? Authorizing Provider   apixaban (ELIQUIS) 5 mg Tab tablet Take 1 tablet (5 mg total) by mouth 2 (two) times a day. 4/24/17  Yes Eusebio Dejesus MD   levETIRAcetam (KEPPRA) 250 MG tablet Take 1 tablet (250 mg total) by mouth 2 (two) times a day. 3/20/17  Yes Eusebio Dejesus MD   lisinopril (PRINIVIL,ZESTRIL) 20 MG tablet Take 1 tablet (20 mg total) by mouth daily. 6/26/17  Yes Eusebio Dejesus MD   metoprolol tartrate (LOPRESSOR) 25 MG tablet Take 1 tablet (25 mg total) by mouth 2 (two) times a day. 7/19/17 7/19/18 Yes Eusebio Dejesus MD   simvastatin (ZOCOR) 20 MG tablet TAKE 1 TABLET BY MOUTH EVERYNIGHT AT BEDTIME 7/16/17  Yes Eusebio Dejesus MD       Allergies:   Allergies   Allergen Reactions     Codeine Nausea Only       Immunizations:   Immunization History   Administered Date(s) Administered     Influenza, inj, historic  09/26/2016     Pneumo Polysac 23-V 06/03/2008       Exam expressive aphasia with right-sided paresis and spasticity chest clear heart tones regular rhythm abdomen benign extremities free of edema neck veins nondistended no thyromegaly or scleral icterus.  Expressive aphasia is quite severe there is spasticity and weakness right side of body.    Assessment and Plan  CVA due to thromboembolic phenomena and obstruction left middle cerebral artery.  On Eliquis.  Check Keppra level lipid panel with next office visit.  Labile hypertension.  Initial blood pressure 172/76 recheck 146 over 58.  With next office visit in 1 month check lipid panel plus Keppra level.    Seizure disorder after CVA left middle cerebral artery.    Codeine allergy.    Hyperlipidemia on statin therapy no recent history of MI or stroke.    Time: total time spent with the patient was 25 minutes of which >50% was spent in counseling and coordination of care    The following high BMI interventions were performed this visit: encouragement to exercise    Eusebio Dejesus MD    Patient Active Problem List   Diagnosis     Cataract     Carpal Tunnel Syndrome     Choledocholithiasis     Cholecystitis     Cerebral infarction due to embolism of middle cerebral artery     Essential hypertension     Atrial fibrillation     Macular degeneration     Primary osteoarthritis involving multiple joints     Chronic low back pain     GERD (gastroesophageal reflux disease)     Breast cancer     IBS (irritable bowel syndrome)     Colon polyp     Acute headache     Vertigo     Seizure

## 2021-06-13 NOTE — PROGRESS NOTES
Called and left voicemail for daughter Sylwia to remind of appt tomorrow with our RN.     Next Outreach:10/25/17

## 2021-06-13 NOTE — PROGRESS NOTES
The Clinic Care Guide called the patient  today at the request of the PCP to discuss possible clinic care coordination enrollment. Clinic care coordination was described to the patient and immediate needs were discussed. The patient agreed to enrollment in clinic care coordination and future appointments were scheduled for an RN care coordination assessment and an enrollment visit with the primary care physician and care guide. The patient was provided with contact information for the clinic care guide.    PCAM date 10/25/2017  Goal Setting Date: 11/06/2017    Pt's daughter Kathie questioned CG about the referral to CCC stating that she had requested a PT/OT/Home Care Risk Assessment.  CG spoke to Dr Dejesus since orders for that were not entered.  Dr Dejesus agreed to entering these orders and Kathie has been notified.    Nect Outreach: 11/06/2017

## 2021-06-13 NOTE — PROGRESS NOTES
My Clinic Care Coordination Care Plan    [facility]  [address]  [address]  [address] [place]  [address]  [phone]      My Preferred Method of Contact:  Phone:  home: [phone] or daughter Mando cell: [phone]    My Primary/Preferred Language:  English    Preferred Learning Style:  Reading information, Face to face discussion, Pictures/Diagrams and Hands on teaching    Emergency Contact: Extended Emergency Contact Information  Primary Emergency Contact: RenettaSam   [facility]  Home Phone: [phone]  Relation: Child  Secondary Emergency Contact: HanAngelitaSylwia   [facility]  Home Phone: [phone]  Mobile Phone: [phone]  Relation: Child     PCP:  Eusebio Dejesus MD  Specialists:    Care Team            Eusebio Dejesus MD PCP - General    [phone]     [facility] Care Coordination Care Guide, Clinic Care Coordination    [facility] Ph: [phone] Fax: [phone]    Clive Conde MD Neurology    [phone]     [facility] [phone]    Garo Kang MD Cardiology    [phone]     [facility] [phone]          Hospitalizations and/or ED Visits  Most Recent: 04/2017     Previous:  03/24/2017   Reason:  Gall stone/URI    Concerns regarding my health I had a bloody nose this morning and am taking blood thinners.    Advanced Directive/Living Will: I have one that I will bring in to my next appointment      Nicole elected to enroll in the [facility] Care Coordination.  Nicole was given a copy of the Clinic Care Coordination brochure and full description of how to access their care team both during clinic hours and after hours.   My Care Guide's Name and Phone Number:  Jaimie Omid [phone]  The Care Guide and myself agreed to be in contact every 2 weeks.      Medication Update  The patient's medication list is up to date per  RONNA Wesley    Health Maintenance and Immunization Update  The patient's preventive health screenings and immunizations are up to date per RONNA Wesley.    My Emergency Plan  Symptoms of a Stroke    During a stroke, blood stops flowing to part of the brain. This can damage areas in the brain that control the rest of the body. Get help right away if any of these symptoms come on suddenly, even if the symptoms don t last.     Know the symptoms of a stroke                Weakness. You may feel a sudden weakness, tingling, or a loss of feeling on 1 side of your face or body including your arm or leg.     Vision problems. You may have sudden double vision or trouble seeing in 1 or both eyes.    Speech problems. You may have sudden trouble talking, slurred speech, or problems understanding others.    Headache. You may have a sudden, severe headache.    Movement problems. You may have sudden trouble walking, dizziness, a feeling of spinning, a loss of balance, a feeling of falling, or blackouts.    Seizure. You may also have a seizure with a large or hemorrhagic stroke.      REMEMBER: If you have any of these symptoms, call 911 and your doctor as soon as possible.     Preventing Falls    Having a health problem can make you more likely to fall. Taking certain kinds of medicines may also increase your risk of falls. So, improving your health can help you avoid a fall. Work with your healthcare provider to manage health problems and to review your medicines. If you have your health under control, your risk of falling is lessened.     When to call your healthcare provider  Be sure to call your healthcare provider if you fall. Also call if you have any of these signs or symptoms (someone else may need to point them out to you):    Feeling lightheaded or dizzy more than once a day    Losing your balance often or feeling unsteady on your feet    Feeling numbness in your legs or feet, or noticing a change in the way you walk    Having a  steady decline in your memory or mental sharpness       All [facility] clinic patients have access to a Nurse 24 hours a day, 7 days a week.  If you have questions or want advice from a Nurse, please know [facility] is here for you.  You can call your clinic and they will connect you or you can call [facility] at [phone].  [facility] also has Walk In Care clinics in multiple locations.  Call the number listed above for more information about our Walk In Care clinics or visit the [facility] website at [email or web address].    Patient Support  I will ask Kathie for help in supporting me in these goals  Relationship to patient: Daughter  Cell # : [phone] , best time to call any time

## 2021-06-13 NOTE — PROGRESS NOTES
Office Visit - Follow up    Nicole Jackson   82 y.o. female    Date of Visit: 11/6/2017    Chief Complaint   Patient presents with     Hypertension       CVA     Atrial Fibrillation       Subjective: Atrial fibrillation with history of stroke hypertension.  Recent nosebleed.  Discussed K-Y jelly to the nares twice daily for moisturizing effect.  Lipids last checked September 5, 2017 all good.    The patient has a dense parous cyst involving the right side of her body she has expressive aphasia as a complicating event from thromboembolic complication atrial fibrillation.  Patient now on Eliquis well-tolerated.  More nosebleeds of late.  No blood in stool or urine no chest pain or shortness of breath.  Some left-sided abdominal pain prior to a bowel movement once the bowel movement has occurred the pain goes away.  No nausea or vomiting or weight loss.  Medication list reviewed generally well-tolerated.  Weight down 2 pounds.    ROS: A comprehensive review of systems was performed and was otherwise negative    Medications:  Prior to Admission medications    Medication Sig Start Date End Date Taking? Authorizing Provider   apixaban (ELIQUIS) 5 mg Tab tablet Take 1 tablet (5 mg total) by mouth 2 (two) times a day. 4/24/17   Eusebio Dejesus MD   atorvastatin (LIPITOR) 20 MG tablet Take 1 tablet (20 mg total) by mouth daily. 9/7/17 9/7/18  Eusebio Dejesus MD   levETIRAcetam (KEPPRA) 250 MG tablet TAKE ONE TABLET BY MOUTH TWICE DAILY  10/13/17   Eusebio Dejesus MD   lisinopril (PRINIVIL,ZESTRIL) 20 MG tablet Take 1 tablet (20 mg total) by mouth daily. 6/26/17   Eusebio Dejesus MD   metoprolol tartrate (LOPRESSOR) 25 MG tablet Take 1 tablet (25 mg total) by mouth 2 (two) times a day. 7/19/17 7/19/18  Eusebio Dejesus MD   simvastatin (ZOCOR) 20 MG tablet TAKE 1 TABLET BY MOUTH EVERYNIGHT AT BEDTIME 7/16/17   Eusebio Dejesus MD       Allergies:   Allergies   Allergen Reactions     Codeine Nausea Only        Immunizations:   Immunization History   Administered Date(s) Administered     Influenza, inj, historic 09/26/2016     Pneumo Polysac 23-V 06/03/2008       Exam Chest clear to auscultation and percussion.  Heart tones regular rhythm without murmur rub or gallop.  Abdomen soft nontender no organomegaly.  No peritoneal signs.  Extremities free of edema cyanosis or clubbing.  Neck veins nondistended no thyromegaly or scleral icterus noted, carotids full.  Skin warm and dry easily conversant good spirited.  Normal intelligence.  Neurologically intact no gross localizing findings.  Dense parous cyst involving the right side of the body with marked expressive aphasia no evidence for Wernicke's aphasia cognition is good.  Vocabulary limited although her daughter who accompanies her here today says that more words of being spoken by the patient and her mother.    Codeine allergy.    Assessment and Plan  CVA with dense parous cyst right side and spasticity and expressive aphasia.  Hypertension and history of atrial fibrillation now on Eliquis.    Codeine allergy.    Hyperlipidemia on statin therapy RTC in 2 months time with fasting office visit with labs.  Labs from September 5, 2017 reviewed.    Time: total time spent with the patient was 25 minutes of which >50% was spent in counseling and coordination of care    The following high BMI interventions were performed this visit: encouragement to exercise    Eusebio Dejesus MD    Patient Active Problem List   Diagnosis     Cataract     Carpal Tunnel Syndrome     Choledocholithiasis     Cholecystitis     Cerebral infarction due to embolism of middle cerebral artery     Essential hypertension     Atrial fibrillation     Macular degeneration     Primary osteoarthritis involving multiple joints     Chronic low back pain     GERD (gastroesophageal reflux disease)     Breast cancer     IBS (irritable bowel syndrome)     Colon polyp     Acute headache     Vertigo     Seizure

## 2021-06-13 NOTE — PROGRESS NOTES
"Pt came to appt with her daughter Kathie, who will be pt's support person.  Kathie reports that pt was discharged from PT at the [facility] as they felt pt could do the exercises at home.  Kathie reports that pt is not doing them to the best of her ability and feels that pt could really benefit from more PT at the [facility].  Kathie reports that pt really enjoyed going there for the therapy, it gets her out of the house, and would prefer to do PT in a facility. Pt's daughter also wants pt to have an in home home risk assessment, but does not want PT/OT services in her home.  CG has sent telephone encounter to PCP for the orders.  Kathie also reports that pt is currently using a [employer] at home to help communicate.  Outreach will take place with Kathie as pt has a hard time communicating and pt has signed the \"patient family contact form\" which has been sent for scanning.    Next Outreach: 11/20/2017  "

## 2021-06-13 NOTE — PROGRESS NOTES
RN Recommendations and Referrals  Home Safety Evaluation with Home Care:  Request PCP to reorder  PT/OT Evaluation: Prefers to do outpatient PT/OT    Action Plan    RN Will  Be available to the patient as nursing needs arise  Request Orders from PCP for home safety evaluation and outpatient PT/OT.    Care Guide Will  At goal setting visit : Review goals set at PCAM visit and create or add to action plan.    Goals        Patient Stated      Family stated: Mom likes to get out of the house so would like to do outpatient PT/OT .  (pt-stated)            Action steps to achieve this goal  1.  [facility] RN will request PCP to place order for outpatient PT/OT.     Date goal set:  10/25/17        Family stated: We would like a home safety evaluation but do not want ongoing PT/OT in the home.  (pt-stated)            Action steps to achieve this goal  1.  [facility] RN will notify PCP to place another order if needed.     Date goal set:  10/25/17            Clinic Care Coordination RN Assessed Needs  Patient Centered Assessment Method-PCAM TOTAL SCORE: 30 (10/25/2017  2:51 PM)  Level 2:  A score of 25-36 indicates that the patient has a moderate initial need for RN or SW intervention at the discretion of the .  The RN will add this patient to her panel and follow closely in partnership with the care guide until stable.  She will reach out to the care guide for support in care coordination needs and graduate the patient to standard care guide outreach when appropriate.      PCAM (Patient Centered Assessment Method)   HEALTH AND WELL-BEING  Physical Health Concerns: History of Stroke  Other Physical Health Concerns:: Right side weakness, walks with cane, wears a brace on her right leg, unable to communicate verbally, but does seem to comunicate her needs to her family; 1 assist with bathing, but independent with grooming and toileting  RN Assessment: Physical Health Needs: Severe symptoms or problems that cause significant impact on  daily life  RN Assessment: Physical Health Problems: Severe imact upon mental well-being and preventing engagment with usual activities  Mental Health Concerns: Denies concerns that require further investigation  RN Assessment:Other Mental Well-Being Concern: No identified areas of concern  Lifestyle/Habit Concerns: Denies concerns that require further investigation  RN Assessment: Lifestyle Behaviors: Some mild concern of potential negative impact on well-being    SOCIAL ENVIRONMENT  Home Environment Concerns: Denies concerns that require further investigation  Other Home Environment Concerns:: Family or other care giver with her almost 24/7 at this time; left alone for 1-2 hours occasionally- has health alert ; 3 level home, but stays only on 1st floor;   RN Assessment: Home Environment: Consistently safe, supportive, stable, no identified problems  Daily Activities Concerns: Denies concerns that require further investigation  Other Daily Activities Concerns:: Does houshold chores as she is able (washes dishes and folds laundry), meals are prepared by care givers; transportation provided by family  RN Assessment: Daily Activites: Adequate participation with social networks  Social Network Concerns: Denies concerns that require further investigation  RN Assessment: Social Network: Adequate participation with social networks  Financial Status and Service Concerns: Denies concerns that require further investigation  Other Financial Status and Service Concerns:: A son and DTR who are not present today are POA  RN Assessment: Financial Resources: Financially secure, resources adequate, no identified problems    HEALTH LITERACY AND COMMUNICATION  Understanding of Health and Wellbeing Concerns: Denies concerns that require further investigation  RN Assessment: Health Literacy: Reasonable to good understanding and already engages in managing health or is willing to undertake better management  Engagement Concerns: Serious  difficulties in communication, with severe barriers  Other Engagement Concerns:: Cannot communicate verbally, but is able to communicate needs to her family and care givers non-verbally  RN Assessment: Engagement: Serious difficulties in communication, with severe barriers    SERVICE COORDINATION  Other Services: Other care/services missing and/or fragmented  Coordination of Services: Required care/services missing and/or fragmented    PCAM TOTAL SCORE: 30    Emergency Plan  Symptoms of a Stroke    During a stroke, blood stops flowing to part of the brain. This can damage areas in the brain that control the rest of the body. Get help right away if any of these symptoms come on suddenly, even if the symptoms don t last.    Know the symptoms of a stroke             Weakness. You may feel a sudden weakness, tingling, or a loss of feeling on 1 side of your face or body including your arm or leg.     Vision problems. You may have sudden double vision or trouble seeing in 1 or both eyes.    Speech problems. You may have sudden trouble talking, slurred speech, or problems understanding others.    Headache. You may have a sudden, severe headache.    Movement problems. You may have sudden trouble walking, dizziness, a feeling of spinning, a loss of balance, a feeling of falling, or blackouts.    Seizure. You may also have a seizure with a large or hemorrhagic stroke.     REMEMBER: If you have any of these symptoms, call 911 and your doctor as soon as possible.    Preventing Falls    Having a health problem can make you more likely to fall. Taking certain kinds of medicines may also increase your risk of falls. So, improving your health can help you avoid a fall. Work with your healthcare provider to manage health problems and to review your medicines. If you have your health under control, your risk of falling is lessened.    When to call your healthcare provider  Be sure to call your healthcare provider if you fall. Also  call if you have any of these signs or symptoms (someone else may need to point them out to you):    Feeling lightheaded or dizzy more than once a day    Losing your balance often or feeling unsteady on your feet    Feeling numbness in your legs or feet, or noticing a change in the way you walk    Having a steady decline in your memory or mental sharpness

## 2021-06-14 NOTE — TELEPHONE ENCOUNTER
Reason for Call:  Home Health Care    [facility]  Homecare called regarding (reason for call): needs verbal order for PT, skilled nurse visit eval    Orders are needed for this patient. Pt , skilled nursing     PT: 1/wk for 5 wks    OT: none    Skilled Nursing: eval     Pt Provider: Oleg    Phone Number Homecare Nurse can be reached at: [phone]    Can we leave a detailed message on this number? Yes     Phone number patient can be reached at: Home number on file [phone] (home)    Best Time: anytime    Call taken on 12/28/2020 at 2:22 PM by Samson Hernández

## 2021-06-14 NOTE — PROGRESS NOTES
Attempt 1: Care Guide called patient.  If this patient is returning my call, please transfer to Jaimie at ext [phone].    Next Outreach: 12/29/2017

## 2021-06-14 NOTE — PROGRESS NOTES
Nicole Jackson is a 85 y.o. female who is being evaluated via a billable telephone visit.      What phone number would you like to be contacted at? [phone]  How would you like to obtain your AVS? AVS Preference: Ember.  Assessment & Plan     Food intolerance in adult  Called by the daughter because his brother who lives with her mom reports she has diarrhea for 3 days. But upon questioning through the son she only has loose stools which occur once a day. So based on strict definition of diarrhea, she does not have diarrhea. Also reviewed her chart, she had successful ERCP with sphincterotomy in December for bile duct stone. Advised, son and daughter this can contribute to loose stool. Advised to rest her stomach by giving her BRAT or bland diet for the next 2 days and observe. Advised to follow up with Dr. Dejesus if she has increased frequency of stooling, presence of fever and abdominal pains.         No follow-ups on file.    Sesar Norwood MD  [facility]     Nicole Jackson is 85 y.o. and presents to clinic today for the following health issues           Review of Systems  Loose stools occurring once day, no abdominal cramps, fever.       Objective       Vitals:  No vitals were obtained today due to virtual visit.    Physical Exam  None             Phone call duration: 11  minutes

## 2021-06-14 NOTE — TELEPHONE ENCOUNTER
Refill Approved    Rx renewed per Medication Renewal Policy. Medication was last renewed on 1/7/20.    Tana Bianchi, [facility] Triage/Med Refill 1/7/2021     Requested Prescriptions   Pending Prescriptions Disp Refills     levETIRAcetam (KEPPRA) 250 MG tablet [Pharmacy Med Name: levETIRAcetam Oral Tablet 250 MG] 180 tablet 0     Sig: TAKE 1 TABLET (250 MG TOTAL) BY MOUTH 2 (TWO) TIMES A DAY.       Gabapentin/Levetiracetam/Tiagabine Refill Protocol  Passed - 1/7/2021  2:00 AM        Passed - PCP or prescribing provider visit in past 12 months or next 3 months     Last office visit with prescriber/PCP: 11/3/2020 Eusebio Dejesus MD OR same dept: 11/3/2020 Eusebio Dejesus MD OR same specialty: 11/3/2020 Eusebio Dejesus MD  Last physical: Visit date not found Last MTM visit: Visit date not found   Next visit within 3 mo: Visit date not found  Next physical within 3 mo: Visit date not found  Prescriber OR PCP: Eusebio Dejesus MD  Last diagnosis associated with med order: 1. Seizure (H)  - levETIRAcetam (KEPPRA) 250 MG tablet [Pharmacy Med Name: levETIRAcetam Oral Tablet 250 MG]; TAKE 1 TABLET (250 MG TOTAL) BY MOUTH 2 (TWO) TIMES A DAY.  Dispense: 180 tablet; Refill: 0    If protocol passes may refill for 12 months if within 3 months of last provider visit (or a total of 15 months).

## 2021-06-14 NOTE — PROGRESS NOTES
Attempt 1: Care Guide called patient.  If this patient is returning my call, please transfer to Jaimie at ext [phone].    Next Outreach: 12/07/2017

## 2021-06-14 NOTE — TELEPHONE ENCOUNTER
"Caller is pt's daughter (Sylwia Short).  Currently not an authorized rep on pt's chart.  (Pt had past stroke; unable to speak.)    Therefore receiving information primarily, and also now conference-calling patient's son (Bahman) who currently lives with patient ...    Pt has rec'd home care services following Dec 2020 inpatient status for choledocholithiasis.  Had f/u OV 1/8/2021 -> doing well at that time.    New x four days -> Diarrhea with abdominal pain.  \"Soiling herself every day.\"  Today took Imodium.    No fevers.  Current temp 97.2 (via temporal scanner)  No chills.    Pt's son (Bahman) states \"She's still strong enough to get around.\"  \"She's holding her abdomen at times.\"    Drinking water and 7-Up.  Currently eating crackers.  Ate macaroni & cheese yesterday.    Due to prolonged loose stools (now four days), provider eval is indicated.  Possible diarrhea associated with prior bile duct issues.  Or, possible IBS flare.    Family agrees to telephone provider visit to discuss next steps, such as order for stool sample, new meds, other.  Transferred to a  for an appointment now.    Maggie Mueller RN  [facility] Triage     Reason for Disposition    MODERATE diarrhea (e.g., 4-6 times / day more than normal) and age > 70 years    Abdominal pain  (Exception: pain clears completely with each passage of diarrhea stool)    Additional Information    Negative: Shock suspected (e.g., cold/pale/clammy skin, too weak to stand, low BP, rapid pulse)    Negative: Difficult to awaken or acting confused (e.g., disoriented, slurred speech)    Negative: Sounds like a life-threatening emergency to the triager    Negative: Vomiting also present and worse than the diarrhea    Negative: Blood in stool and without diarrhea    Negative: SEVERE abdominal pain (e.g., excruciating) and present > 1 hour    Negative: SEVERE abdominal pain and age > 60    Negative: Bloody, black, or tarry bowel movements    Negative: SEVERE " "diarrhea (e.g., 7 or more times / day more than normal) and age > 60 years    Negative: Constant abdominal pain lasting > 2 hours    Negative: Drinking very little and has signs of dehydration (e.g., no urine > 12 hours, very dry mouth, very lightheaded)    Negative: Patient sounds very sick or weak to the triager    Negative: SEVERE diarrhea (e.g., 7 or more times / day more than normal) and present > 24 hours (1 day)    Negative: MODERATE diarrhea (e.g., 4-6 times / day more than normal) and present > 48 hours (2 days)    Protocols used: DIARRHEA-A-OH      _______________________    COVID 19 Nurse Triage Plan/Patient Instructions    Please be aware that novel coronavirus (COVID-19) may be circulating in the community. If you develop symptoms such as fever, cough, or SOB or if you have concerns about the presence of another infection including coronavirus (COVID-19), please contact your health care provider or visit [email or web address].     Disposition/Instructions    Additional COVID19 information to add for patients.   How can I protect others?  If you have symptoms (fever, cough, body aches or trouble breathing): Stay home and away from others (self-isolate) until:    At least 10 days have passed since your symptoms started, And     You ve had no fever--and no medicine that reduces fever--for 1 full day (24 hours), And      Your other symptoms have resolved (gotten better).     If you don t have symptoms, but a test showed that you have COVID-19 (you tested positive):    Stay home and away from others (self-isolate). Follow the tips under \"How do I self-isolate?\" below for 10 days (20 days if you have a weak immune system).    You don't need to be retested for COVID-19 before going back to school or work. As long as you're fever-free and feeling better, you can go back to school, work and other activities after waiting the 10 or 20 days.     How do I self-isolate?    Stay in your own room, even for meals. Use your " own bathroom if you can.     Stay away from others in your home. No hugging, kissing or shaking hands. No visitors.    Don t go to work, school or anywhere else.     Clean  high touch  surfaces often (doorknobs, counters, handles, etc.). Use a household cleaning spray or wipes. You ll find a full list on the EPA website:  www.epa.gov/pesticide-registration/list-n-disinfectants-use-against-sars-cov-2.    Cover your mouth and nose with a mask, tissue or washcloth to avoid spreading germs.    Wash your hands and face often. Use soap and water.    Caregivers in these groups are at risk for severe illness due to COVID-19:  o People 65 years and older  o People who live in a nursing home or long-term care facility  o People with chronic disease (lung, heart, cancer, diabetes, kidney, liver, immunologic)  o People who have a weakened immune system, including those who:  - Are in cancer treatment  - Take medicine that weakens the immune system, such as corticosteroids  - Had a bone marrow or organ transplant  - Have an immune deficiency  - Have poorly controlled HIV or AIDS  - Are obese (body mass index of 40 or higher)  - Smoke regularly    Caregivers should wear gloves while washing dishes, handling laundry and cleaning bedrooms and bathrooms.    Use caution when washing and drying laundry: Don t shake dirty laundry, and use the warmest water setting that you can.    For more tips, go to www.cdc.gov/coronavirus/2019-ncov/downloads/10Things.pdf.    How can I take care of myself?  1. Get lots of rest. Drink extra fluids (unless a doctor has told you not to).     2. Take Tylenol (acetaminophen) for fever or pain. If you have liver or kidney problems, ask your family doctor if it s okay to take Tylenol.     Adults can take either:     650 mg (two 325 mg pills) every 4 to 6 hours, or     1,000 mg (two 500 mg pills) every 8 hours as needed.     Note: Don t take more than 3,000 mg in one day.   Acetaminophen is found in many  medicines (both prescribed and over-the-counter medicines). Read all labels to be sure you don t take too much.     For children, check the Tylenol bottle for the right dose. The dose is based on the child s age or weight.    3. If you have other health problems (like cancer, heart failure, an organ transplant or severe kidney disease): Call your specialty clinic if you don t feel better in the next 2 days.    4. Know when to call 911: Emergency warning signs include:    Trouble breathing or shortness of breath    Pain or pressure in the chest that doesn t go away    Feeling confused like you haven t felt before, or not being able to wake up    Bluish-colored lips or face    What are the symptoms of COVID-19?     The most common symptoms are cough, fever and trouble breathing.     Less common symptoms include body aches, chills, diarrhea (loose, watery poops), fatigue (feeling very tired), headache, runny nose, sore throat and loss of smell.    COVID-19 can cause severe coughing (bronchitis) and lung infection (pneumonia).    How does it spread?     The virus may spread when a person coughs or sneezes into the air. The virus can travel about 6 feet this way, and it can live on surfaces.      Common  (household disinfectants) will kill the virus.    Who is at risk?  Anyone can catch COVID-19 if they re around someone who has the virus.    How can others protect themselves?     Stay away from people who have COVID-19 (or symptoms of COVID-19).    Wash hands often with soap and water. Or, use hand  with at least 60% alcohol.    Avoid touching the eyes, nose or mouth.     Wear a face mask when you go out in public, when sick or when caring for a sick person.    Where can I get more information?     [facility]: About COVID-19: [email or web address]    CDC: What to Do If You re Sick: www.cdc.gov/coronavirus/2019-ncov/about/steps-when-sick.html    CDC: Ending Home Isolation:  www.cdc.gov/coronavirus/2019-ncov/hcp/disposition-in-home-patients.html     CDC: Caring for Someone: www.cdc.gov/coronavirus/2019-ncov/if-you-are-sick/care-for-someone.html     [facility]: Interim Guidance for Hospital Discharge to Home: www.[facility]/diseases/coronavirus/hcp/hospdischarge.pdf    [facility] clinical trials (COVID-19 research studies): clinicalaffairs.[facility]-clinical-trials     Below are the COVID-19 hotlines at the [place] [facility] ([facility]). Interpreters are available.   o For health questions: Call 301-146-4345 or 1-464.633.9665 (7 a.m. to 7 p.m.)  o For questions about schools and childcare: Call 046-447-6056 or 1-216.776.3570 (7 a.m. to 7 p.m.)              Thank you for taking steps to prevent the spread of this virus.  o Limit your contact with others.  o Wear a simple mask to cover your cough.  o Wash your hands well and often.    Resources    [facility]: About COVID-19: [email or web address]    CDC: What to Do If You're Sick: www.cdc.gov/coronavirus/2019-ncov/about/steps-when-sick.html    CDC: Ending Home Isolation: www.cdc.gov/coronavirus/2019-ncov/hcp/disposition-in-home-patients.html     CDC: Caring for Someone: www.cdc.gov/coronavirus/2019-ncov/if-you-are-sick/care-for-someone.html     [facility]: Interim Guidance for Hospital Discharge to Home: www.[facility]./diseases/coronavirus/hcp/hospdischarge.pdf    [facility] clinical trials (COVID-19 research studies): clinicalaffairs.[facility]-clinical-trials     Below are the COVID-19 hotlines at the [place] [facility] ([facility]). Interpreters are available.   o For health questions: Call 489-678-1165 or 1-597.456.3683 (7 a.m. to 7 p.m.)  o For questions about schools and childcare: Call 176-328-6172 or 1-441.629.7194 (7 a.m. to 7 p.m.)

## 2021-06-14 NOTE — TELEPHONE ENCOUNTER
Reason for Call:  Home Health Care    PABLO with [facility]  [facility] called regarding (reason for call): SKILLED NURSING    Orders are needed for this patient. SKILLED NURSING    PT: NA     OT: NA     Skilled Nursing: Med assessment and teaching, disease management and teaching, cardio pulm, home safety, pain, vital sign monitoring  1 visit e 5days for 5 days, then 1 visit/ wk for 4 wks, then 1 -3 prn visits  Speech pathology for eval and treat for swallow education, 1 visit every 10days for 10days    Pt Provider: Oleg    Phone Number Homecare Nurse can be reached at: [phone]    Can we leave a detailed message on this number? Yes     Phone number patient can be reached at:   Pablo from home care [phone]    Best Time: ANYTIME    Call taken on 12/29/2020 at 4:40 PM by Samson Hernández

## 2021-06-15 NOTE — PROGRESS NOTES
Assessment & Plan     Choledocholithiasis with abdominal pain including diarrhea without acholic stools or cola colored urine.  Afebrile.  Today check hemogram comprehensive metabolic profile urinalysis serum lipase.  Previous cholecystectomy and recurrent stones after cholecystectomy common bile duct.  Rule out gastroenteritis with diarrhea predominant symptom no associated nausea or vomiting no melena or hematochezia.    Breast cancer by history no clinical evidence of recurrence.  Accompanied by her daughter.    Hypertension controlled 124/70.    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  Rate controlled on Eliquis 2.5 mg twice daily.  No seizures complicating.    Review of external notes as documented in note  25 minutes spent on the date of the encounter doing chart review, patient visit, documentation and discussion with family        No follow-ups on file.    Eusebio Dejesus MD  [facility]   Nicole Jackson is 85 y.o. and presents today for the following health issues   HPI       Hypertension Follow-up      Do you check your blood pressure regularly outside of the clinic? Yes     Are you following a low salt diet? No    Are your blood pressures ever more than 140 on the top number (systolic) OR more       than 90 on the bottom number (diastolic), for example 140/90? No      How many servings of fruits and vegetables do you eat daily?  0-1    On average, how many sweetened beverages do you drink each day (Examples: soda, juice, sweet tea, etc.  Do NOT count diet or artificially sweetened beverages)?   0    How many days per week do you exercise enough to make your heart beat faster? 3 or less    How many minutes a day do you exercise enough to make your heart beat faster? 9 or less    How many days per week do you miss taking your medication? 0        Review of Systems  No blood in stool or urine no nausea or vomiting.  Feels better today  "medication list reviewed reconciled in the chart.  Non-smoker no excess alcohol.  Daughter present very supportive.  The patient has marked expressive aphasia from prior stroke and thromboembolic involving left middle cerebral artery and Broca's space or area for expressive language function.  Limited.      Objective    /70 (Patient Site: Right Arm, Patient Position: Sitting)   Pulse (!) 55   Temp 97.4  F (36.3  C)   Ht 5' 2\" (1.575 m)   Wt 115 lb (52.2 kg)   SpO2 95%   BMI 21.03 kg/m    Body mass index is 21.03 kg/m .  Physical Exam  Chest clear to auscultation and percussion.  Heart tones regular rhythm without murmur rub or gallop.  Abdomen soft nontender no organomegaly.  No peritoneal signs.  Extremities free of edema cyanosis or clubbing.  Neck veins nondistended no thyromegaly or scleral icterus noted, carotids full.  Skin warm and dry easily conversant good spirited.  Normal intelligence.  Neurologically intact no gross localizing findings.  As this to the right side with hemiparesis right side after prior thromboembolic stroke atrial fibrillation.  Rate controlled now 55 with expressive aphasia marked accompanied by her daughter very supportive history is filled in.  By family.    Vital signs are stable afebrile weight down 1 pound from previous.              "

## 2021-06-15 NOTE — PROGRESS NOTES
Office Visit - Follow up    Nicole Jackson   82 y.o. 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    Hypertension and history of atrial fibrillation with hyperlipidemia.  Last lipids checked on September 5, 2017 the patient may be on pravastatin 40 mg daily.  Also listed in his her chart are 20 mg of simvastatin daily and atorvastatin 20 mg daily.  The patient has marked expressive aphasia she is accompanied by her daughter she has marked spasticity in her right lower extremity and right upper extremity with weakness.  The patient is right-hand dominant.  The patient denies any blood in stool or urine no current problems with current medications.  The patient is on Eliquis for anti-clot on atrial fibrillation.  The patient was complaining of right foot discomfort although the history was limited because of expressive aphasia she was complaining about her shoe we did suggest podiatry consultation she has had hammertoe deformity in a number of toes of the right foot.    ROS: A comprehensive review of systems was performed and was otherwise negative    Medications:  Prior to Admission medications    Medication Sig Start Date End Date Taking? Authorizing Provider   apixaban (ELIQUIS) 5 mg Tab tablet Take 1 tablet (5 mg total) by mouth 2 (two) times a day. 4/24/17  Yes Eusebio Dejesus MD   levETIRAcetam (KEPPRA) 250 MG tablet TAKE ONE TABLET BY MOUTH TWICE DAILY  10/13/17  Yes Eusebio Dejesus MD   lisinopril (PRINIVIL,ZESTRIL) 20 MG tablet Take 1 tablet (20 mg total) by mouth daily. 6/26/17  Yes Eusebio Dejesus MD   metoprolol tartrate (LOPRESSOR) 25 MG tablet Take 1 tablet (25 mg total) by mouth 2 (two) times a day. 7/19/17 7/19/18 Yes Eusebio Dejesus MD    pravastatin (PRAVACHOL) 40 MG tablet  1/17/18   PROVIDER, HISTORICAL   atorvastatin (LIPITOR) 20 MG tablet Take 1 tablet (20 mg total) by mouth daily. 9/7/17 1/26/18  Eusebio Dejesus MD   simvastatin (ZOCOR) 20 MG tablet TAKE 1 TABLET BY MOUTH EVERYNIGHT AT BEDTIME 7/16/17 1/26/18  Eusebio Dejesus MD       Allergies:   Allergies   Allergen Reactions     Codeine Nausea Only       Immunizations:   Immunization History   Administered Date(s) Administered     Influenza, inj, historic,unspecified 09/26/2016     Pneumo Polysac 23-V 06/03/2008       Exam Chest clear to auscultation and percussion.  Heart tones regular rhythm without murmur rub or gallop.  Abdomen soft nontender no organomegaly.  No peritoneal signs.  Extremities free of edema cyanosis or clubbing.  Neck veins nondistended no thyromegaly or scleral icterus noted, carotids full.  Skin warm and dry easily conversant good spirited.  Normal intelligence.  Neurologically intact no gross localizing findings.  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  Daughter present and supportive.    Hyperlipidemia.    Hypertension controlled.    Atrial fibrillation by history with history of thromboembolic complication.  Check lipid panel today linked to diagnosis #1 above return to clinic 2 months time.  Choose one statin and stay on it.    Codeine allergy additional diagnoses.    Time: total time spent with the patient was 25 minutes of which >50% was spent in counseling and coordination of care    The following high BMI interventions were performed this visit: encouragement to exercise    Eusebio Dejesus MD    Patient Active Problem List    Diagnosis     Cataract     Carpal Tunnel Syndrome     Choledocholithiasis     Cholecystitis     Cerebral infarction due to embolism of middle cerebral artery     Essential hypertension     Atrial fibrillation     Macular degeneration     Primary osteoarthritis involving multiple joints     Chronic low back pain     GERD (gastroesophageal reflux disease)     Breast cancer     IBS (irritable bowel syndrome)     Colon polyp     Acute headache     Vertigo     Seizure

## 2021-06-15 NOTE — PROGRESS NOTES
Kathie reports things are going very well at this time and pt is doing great.  Kathie stated that no one has called yet regarding outpatient PT or the home safety evaluation.  CG found notes from 11/08/2018 that a home safety eval was done and it was found that pt is safe in her home.  CG explained to Kathie that it was done and Kathie then remembered the visit and stated it went great.  CG was able to get PT orders entered, vis RONNA Wesley.  Specialty Schedulers have faxed the necessary information to Carol Ann Sales at [place].  No other needs at this time.    Next Outreach: 02/13/2018

## 2021-06-16 NOTE — PROGRESS NOTES
Kathie reports things are going pretty well for pt.  Pt has started PT again at [facility] and Gildardo Rutherford has met with pt at [facility] for a brace.  Kathie said the only needs at this time is that Kathie was under the impression that pt could only be on one statin at a time and that, in January, PCP put pt on Prevastatin back in January of 2018.  Kathie reports that she called into the pharmacy to get a refill on Keppra for pt and when she picked that up, she was also given Atorvastatin for pt.  Kathie stated that she was under the impression that pt should only be on one statin at a a time and is concerned as to wether or not pt should be taking both.  Looks like the Atrovastatin was ended on 01/26/2018.  CG sent telephone encounter to get this cleared up.  No other needs at this time.    Next Outreach: 04/03/2018

## 2021-06-16 NOTE — PROGRESS NOTES
Kathie reports all is going very well.  Pt will have her PT/OT assessment done on 03/02/2018 at [facility].Kathie states that pt has no other needs at this time.    Next Outreach: 03/13/2018

## 2021-06-16 NOTE — PROGRESS NOTES
Office Visit - Follow up    Nicole Jackson   82 y.o. female    Date of Visit: 3/27/2018    Chief Complaint   Patient presents with     Hypertension     Atrial Fibrillation       Subjective: CVA secondary to obstruction or embolus to the left middle cerebral artery.    Here for lipid check.  Doing physical therapy 2 times per week.  Fell 6 weeks ago fracturing the middle finger of her right hand.  She is right-hand dominant and has spastic paresis of the right side.  The patient denies pain in her chest or head she denies shortness of breath.    No blood in stool or urine.  Medication list reviewed well-tolerated normal effects note allergy to codeine.  She is accompanied today by her daughter who is very supportive the patient wears a brace on her right lower extremity.    ROS: A comprehensive review of systems was performed and was otherwise negative    Medications:  Prior to Admission medications    Medication Sig Start Date End Date Taking? Authorizing Provider   apixaban (ELIQUIS) 5 mg Tab tablet Take 1 tablet (5 mg total) by mouth 2 (two) times a day. 4/24/17  Yes Eusebio Dejesus MD   levETIRAcetam (KEPPRA) 250 MG tablet Take 1 tablet (250 mg total) by mouth 2 (two) times a day. 3/13/18  Yes Eusebio Dejesus MD   lisinopril (PRINIVIL,ZESTRIL) 20 MG tablet Take 1 tablet (20 mg total) by mouth daily. 6/26/17  Yes Eusebio Dejesus MD   metoprolol tartrate (LOPRESSOR) 25 MG tablet Take 1 tablet (25 mg total) by mouth 2 (two) times a day. 7/19/17 7/19/18 Yes Eusebio Dejesus MD   pravastatin (PRAVACHOL) 40 MG tablet  1/17/18  Yes PROVIDER, HISTORICAL       Allergies:   Allergies   Allergen Reactions     Codeine Nausea Only       Immunizations:   Immunization History   Administered Date(s) Administered     Influenza, inj, historic,unspecified 09/26/2016     Pneumo Polysac 23-V 06/03/2008       Exam Chest clear to auscultation and percussion.  Heart tones regular rhythm without murmur rub or gallop.   Abdomen soft nontender no organomegaly.  No peritoneal signs.  Extremities free of edema cyanosis or clubbing.  Neck veins nondistended no thyromegaly or scleral icterus noted, carotids full.  Skin warm and dry easily conversant good spirited.  Normal intelligence.  Neurologically intact no gross localizing findings.  Spastic parous is right side of body with impaired expressive aphasia noted.  Cognitive aphasia not present.    Assessment and Plan  CVA secondary to embolic obstruction of the left middle cerebral artery from underlying atrial fibrillation not previously on anticoagulants now on novel Eliquis 5 mg tablets twice daily.  Continue same.  Check lipid panel today.    Hypertension controlled    Codeine allergy.  History of atrial fibrillation with thromboembolic complication with obstruction of left middle cerebral artery with embolism.    Time: total time spent with the patient was 25 minutes of which >50% was spent in counseling and coordination of care    Return to clinic in 3 months time.    Eusebio Dejesus MD    Patient Active Problem List   Diagnosis     Cataract     Carpal Tunnel Syndrome     Choledocholithiasis     Cholecystitis     Cerebral infarction due to embolism of middle cerebral artery     Essential hypertension     Atrial fibrillation     Macular degeneration     Primary osteoarthritis involving multiple joints     Chronic low back pain     GERD (gastroesophageal reflux disease)     Breast cancer     IBS (irritable bowel syndrome)     Colon polyp     Acute headache     Vertigo     Seizure

## 2021-06-17 NOTE — PROGRESS NOTES
Spoke with patient's daughter Sylwia today, she states mom is doing well.     Patient lives at home with one of her daughters. She was living in assisted living and was thriving there socially.   Patient does not have an advance care directive on file. One was sent to Sylwia's home to complete with patient.     Patient has been having issues with her brace, they will mention this at her next visit to [facility] so they can evaluate the fit with the new padding.     Next outreach due: 5/14/18

## 2021-06-18 NOTE — PROGRESS NOTES
Patient is out of town, her niece graduated from the [employer]. Daughter states that travel was challenging but manageable.     Safety:   Daughter Sylwia does have some concerns regarding her mother's safety. Her other daughter Kathie who lives with her mother and stated that she would care for her when patient went home from assisted living is leaving the patient home alone during the day while she works. PCP's letter states that patient should have 24/7 professional care if she were to be discharged from the assisted living facility.   Sylwia states that last week patient turned on the gas on her stove (has a locking ignition on stove) and was confused how to turn it off, she smelled the gas and went outside and called her daughters but was unable to reach them. She tried going in the back door which they keep locked but was able to get back in the front door. When daughter was able to contact patient she was very frightened.   Daughter Sylwia also states that the patient is going outside during the day when she is home alone. Daughter is worried that she could walk away and get lost or get locked out in the heat.     Patient has follow up appointment with PCP 6/28 that Sylwia is unable to attend with the patient, Sylwia has scheduled a separate appointment on 7/5 to discuss her mother's care going forward.    Next outreach due: 7/5/18

## 2021-06-18 NOTE — PROGRESS NOTES
Attempt 2-Care Guide called patient.  If this patient is returning our call please transfer to [place] at ext. [phone].    Next outreach due: 5/30/18

## 2021-06-18 NOTE — PROGRESS NOTES
Attempt 1-Care Guide called patient.  If this patient is returning our call please transfer to [place] at ext. [phone]    Next outreach due: 5/22/18

## 2021-06-18 NOTE — PROGRESS NOTES
Office Visit - Follow up    Nicole Jackson   82 y.o. female    Date of Visit: 6/28/2018    Chief Complaint   Patient presents with     Hypertension     Atrial Fibrillation       Subjective: Hypertension with history of atrial fibrillation and middle cerebral artery stroke.  Right-sided paresis with spasticity.  The patient has a skin tear right hand discussed reviewed observe no sign of secondary infection.    No blood in stool or urine medication list reviewed.  Expressive and cognitive aphasia.  Daughter fills in and is supportive.    Medication list reviewed well-tolerated normal effects.    Codeine allergy listed.  Patient had a history of atrial fibrillation and was not on adequate anticoagulation therapy.  Currently on Eliquis.  During a spell of atrial fibrillation and thromboembolic complication involved involving the middle cerebral artery on the left side.  She had a stroke.    ROS: A comprehensive review of systems was performed and was otherwise negative    Medications:  Prior to Admission medications    Medication Sig Start Date End Date Taking? Authorizing Provider   apixaban (ELIQUIS) 5 mg Tab tablet Take 1 tablet (5 mg total) by mouth 2 (two) times a day. 5/12/18  Yes Eusebio Dejesus MD   levETIRAcetam (KEPPRA) 250 MG tablet Take 1 tablet (250 mg total) by mouth 2 (two) times a day. 3/13/18  Yes Eusebio Dejesus MD   lisinopril (PRINIVIL,ZESTRIL) 20 MG tablet Take 1 tablet (20 mg total) by mouth daily. 6/26/17  Yes Eusebio Dejesus MD   metoprolol tartrate (LOPRESSOR) 25 MG tablet Take 1 tablet (25 mg total) by mouth 2 (two) times a day. 7/19/17 7/19/18 Yes Eusebio Dejesus MD   pravastatin (PRAVACHOL) 40 MG tablet  1/17/18  Yes PROVIDER, HISTORICAL       Allergies:   Allergies   Allergen Reactions     Codeine Nausea Only       Immunizations:   Immunization History   Administered Date(s) Administered     Influenza, inj, historic,unspecified 09/26/2016     Pneumo Polysac 23-V 06/03/2008        Exam Chest clear to auscultation and percussion.  Heart tones regular rhythm without murmur rub or gallop.  Abdomen soft nontender no organomegaly.  No peritoneal signs.  Extremities free of edema cyanosis or clubbing.  Neck veins nondistended no thyromegaly or scleral icterus noted, carotids full.  Skin warm and dry easily conversant good spirited.  Normal intelligence.  Neurologically intact no gross localizing findings.  Weakness and spasticity right upper and right lower extremity with expressive aphasia.  Patient wears a brace around the right lower extremity ankle.  I encouraged her to keep wearing this.    Assessment and Plan  Hypertension with history of atrial fibrillation and complicating thromboembolic event involving left middle cerebral artery with right-sided paresis spasticity and expressive and cognitive aphasia.    Codeine allergy.    Lipid panel with next visit last LDL cholesterol 82 when it was checked in March 2018.    O2 sats 99% pulse 60.  Blood pressure 134/76 BMI is ideal at 24+.    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

## 2021-06-18 NOTE — LETTER
Letter by Nikole Serraon RN at      Author: Nikole Serrano RN Service: -- Author Type: --    Filed:  Encounter Date: 3/1/2019 Status: (Other)       Breana Drake Advance Care Planning  [facility]  [address] [place] [address]        Nicole DUSTY Renetta  [address] [place] [address]    Dear Nicole    We have reviewed the Health Care Directive dated 8-31-18 which you presented for addition to   your medical record. Unfortunately, our review indicates the document is not legally valid for   the following reason(s): You did not sign the document.  In order to create a legal document you will need to sign the document and have your signature witnessed by 2 people or notarized. Notaries and witnesses cannot be named as your health care agents per state law. In addition, only one of your witnesses can be employed by our health care system. It can not be created by someone else for you.        We greatly value the opportunity to assist you in documenting your choices and to honor your   wishes. We apologize for any inconvenience. We have several options to assist you in updating   your document so it meets legal requirements.       Health Care Directives and Advance Care Planning resources can be viewed and printed   for free at our web site:[email or web address].       COPIES of completed Health Care Directives can be brought or mailed to any of our   locations, including the address listed below. You can also email a copy to [email or web address] .       For additional assistance or questions you can email us at [email or web address] or call [phone]    Sincerely,     Breana Drake Advance Care Planning  [facility]  [address] [place] [address]   Email us: [email or web address] Call us: [phone]  Visit at: [email or web address]

## 2021-06-19 NOTE — LETTER
Letter by Eusebio Dejesus MD at      Author: Eusebio Dejesus MD Service: -- Author Type: --    Filed:  Encounter Date: 4/16/2019 Status: (Other)         Nicole Jackson  [address] [place] [address]             April 16, 2019         Dear Ms. Renetta,    Below are the results from your recent visit:    Resulted Orders   Lipid Cascade   Result Value Ref Range    Cholesterol 156 <=199 mg/dL    Triglycerides 68 <=149 mg/dL    HDL Cholesterol 64 >=50 mg/dL    LDL Calculated 78 <=129 mg/dL    Patient Fasting > 8hrs? Yes        All very good results    Please call with questions or contact us using [employer].    Sincerely,        Electronically signed by Eusebio Dejesus MD

## 2021-06-19 NOTE — LETTER
Letter by Mercedez Mcclain CHW at      Author: Mercedez Mcclain CHW Service: -- Author Type: --    Filed:  Encounter Date: 8/2/2019 Status: (Other)       Dear Nicole Jackson,                                                                        On 10/25/2017 you enrolled with the [facility]'s Clinic Care  Coordination Services.  In order for the Clinic Care  Coordination team to provide guidance in completing the goals and actions steps you have established, you and I agreed we would be in contact every monthly.                                  We last spoke on 6/5/19 (with daughter) in order to follow up on goals and action steps.  Since then, I have tried calling you 2 times in the last two weeks and have been unsuccessful in reaching you.             Please call me at [phone] at your earliest convenience.  If you reach my voicemail, please leave a message with your daytime telephone number and a date and time that I can return your call.                                                                            Sincerely,                                                                         MAURA Cohen                                                                     Clinic Care Coordination                                          [facility]                                                Phone: [phone]

## 2021-06-19 NOTE — PROGRESS NOTES
Patient has completed PT/OT     Patient has not gone outside unattended while daughter is at work recently.   They have an appointment the end of August to complete POA.    Next outreach due: 8/24/18

## 2021-06-19 NOTE — LETTER
Letter by Mercedez Mcclain CHW at      Author: Mercedez Mcclain CHW Service: -- Author Type: --    Filed:  Encounter Date: 8/30/2019 Status: (Other)         August 30, 2019          Nicole DUSTY Jackson  [address] [place] [address]        DearJoan,                                                                         You were recently referred to the [facility]'s Clinic Care Coordination service.  This is a service offered through your Primary Care Clinic which can help you access resources, services in regard to your health and well-being goals. The clinic Community Health Worker has placed two calls to you to discuss the nature of this service and to offer enrollment.  If you are interested in learning more about Clinic Care Coordination, please call your Primary Care Clinic's Community Health Worker, [place], at [phone].                                                    Sincerely,                                                                              MAURA Newsome                                                                                          Clinic Care Coordination                                                  [facility]                                Phone: [phone]

## 2021-06-19 NOTE — PROGRESS NOTES
Attempt 1-Care Guide called patient.  If this patient is returning our call please transfer to [place] at ext. [phone]    Next outreach due: 7/17/18

## 2021-06-19 NOTE — LETTER
Letter by Mercedez Mcclain CHW at      Author: Mercedez Mcclain CHW Service: -- Author Type: --    Filed:  Encounter Date: 8/30/2019 Status: (Other)       August 30, 2019        Nicole DUSTY Jackson  [address] [place] [id number]        Dear Nicole,                                                                On 10/25/2017, you enrolled with the [facility]'s Clinic Care Coordination services.  In order for the Clinic Care Coordination team to provide guidance in completing the goals and action steps you have established, we agreed we would be in contact every month.  I last spoke with your daughter Sylwia on 6/5/19 in order to follow up on goals and action steps.     I have been trying to reach your family members since 7/23/19 and have been unsuccessful.  At this time, you have been disenrolled from Clinic Care Coordination and you will no longer receive follow up calls from the Clinic Care Coordination team. If you would like access to services in the future, please discuss your needs with your Primary Care Provider at your next office visit.                                                                                                    Sincerely,                                                                         MAURA Newsome                                                                         Clinic Care Coordination                                         [facility]                                                  Phone: [phone]

## 2021-06-19 NOTE — LETTER
Letter by Eusebio Dejesus MD at      Author: Eusebio Dejesus MD Service: -- Author Type: --    Filed:  Encounter Date: 11/8/2019 Status: Signed         Nicole Jackson  [address] [place] [address]             November 8, 2019         Dear Ms. Jackson,    Below are the results from your recent visit:    Resulted Orders   Lipid Cascade   Result Value Ref Range    Cholesterol 162 <=199 mg/dL    Triglycerides 60 <=149 mg/dL    HDL Cholesterol 66 >=50 mg/dL    LDL Calculated 84 <=129 mg/dL    Patient Fasting > 8hrs? Unknown        All very good results    Please call with questions or contact us using [employer].    Sincerely,        Electronically signed by Eusebio Dejesus MD

## 2021-06-20 NOTE — PROGRESS NOTES
Office Visit - Follow up    Nicole Jackson   83 y.o. female    Date of Visit: 9/10/2018    Chief Complaint   Patient presents with     Hypertension     Coronary Artery Disease     Hyperlipidemia     Follow-up     ER hypoglycemia  8/30/2018       Subjective: Atrial fibrillation with history of thromboembolic complex lesions and obstruction of the left middle cerebral artery.  Resultant expressive aphasia limits the exam today.  History is thereby limited.  2 daughters are present Kathie and Lily they fill in.  She did have a spell of hypoglycemia with blood sugar in the 40s.  The patient is not on any oral hypoglycemic medications and no insulin.  Wonder about underlying insulinoma the patient's daughter Cristy has taken other blood sugars multiple none were lower than 90.  The patient does not have diabetes.  No blood in stool or urine medication list reviewed well-tolerated normal effects codeine allergy listed.    ROS: A comprehensive review of systems was performed and was otherwise negative    Medications:  Prior to Admission medications    Medication Sig Start Date End Date Taking? Authorizing Provider   apixaban (ELIQUIS) 5 mg Tab tablet Take 1 tablet (5 mg total) by mouth 2 (two) times a day. 5/12/18  Yes Eusebio Dejesus MD   atorvastatin (LIPITOR) 20 MG tablet Take 20 mg by mouth at bedtime.   Yes PROVIDER, HISTORICAL   levETIRAcetam (KEPPRA) 250 MG tablet Take 1 tablet (250 mg total) by mouth 2 (two) times a day. 3/13/18  Yes Eusebio Dejesus MD   lisinopril (PRINIVIL,ZESTRIL) 20 MG tablet Take 1 tablet (20 mg total) by mouth daily. 6/26/17  Yes Eusebio Dejesus MD   metoprolol tartrate (LOPRESSOR) 25 MG tablet TAKE ONE TABLET BY MOUTH TWICE DAILY  7/15/18  Yes Eusebio Dejesus MD   pravastatin (PRAVACHOL) 40 MG tablet  1/17/18 9/10/18  PROVIDER, HISTORICAL       Allergies:   Allergies   Allergen Reactions     Codeine Nausea Only       Immunizations:   Immunization History   Administered Date(s)  Administered     Influenza, inj, historic,unspecified 09/26/2016     Pneumo Polysac 23-V 06/03/2008       Exam Chest clear to auscultation and percussion.  Heart tones regular rhythm without murmur rub or gallop.  Abdomen soft nontender no organomegaly.  No peritoneal signs.  Extremities free of edema cyanosis or clubbing.  Neck veins nondistended no thyromegaly or scleral icterus noted, carotids full.  Skin warm and dry easily conversant good spirited.  Normal intelligence.  Neurologically intact no gross localizing findings.    Assessment and Plan  CVA with atrial fibrillation and thromboembolic complications and obstruction of left middle cerebral artery with resultant right-sided paresis and spasticity and most significantly expressive aphasia.  Little or no cognitive aphasia.  The patient has had a spell of hypoglycemia unexplained codeine allergy.    Consider insulinoma consider checking insulin level with blood sugar with next spell doubt seizure disorder unless related to hypoglycemia precipitating.  RTC 6 weeks time.    Occasionally hypoglycemia is a forerunner or premonitory sign of resultant type 2 diabetes.  Discussed in detail with the family they understand.  And are very supportive.    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

## 2021-06-20 NOTE — PROGRESS NOTES
Attempt 1-Care Guide called patient.  If this patient is returning our call please transfer to [place] at ext. [phone]    Next outreach due: 10/15/18

## 2021-06-20 NOTE — PROGRESS NOTES
Office Visit - Follow up    Nicole Jackson   83 y.o. female    Date of Visit: 8/29/2018    Chief Complaint   Patient presents with     Hypertension     Nevus     on back       Subjective: Acute occlusion of the middle cerebral artery and left side with expressive aphasia.  History is thereby limited the patient is expressive aphasia.  The patient's son Sam accompanies her here today.    We did start a health care directive for the patient and his family member Sam the son.  The rest should be reviewed again by the family and patient and completed by them.    This was not witnessed or signed by anyone in our office.    No blood in stool or urine no chest pain or shortness of breath medication list reviewed well-tolerated.  The right side does have spasticity and profound weakness and there is some pain according to what we were able to determine from the patient.    No blood in stool or urine medication list reviewed well-tolerated.  Codeine allergy listed.    ROS: A comprehensive review of systems was performed and was otherwise negative    Medications:  Prior to Admission medications    Medication Sig Start Date End Date Taking? Authorizing Provider   apixaban (ELIQUIS) 5 mg Tab tablet Take 1 tablet (5 mg total) by mouth 2 (two) times a day. 5/12/18  Yes Eusebio Dejesus MD   levETIRAcetam (KEPPRA) 250 MG tablet Take 1 tablet (250 mg total) by mouth 2 (two) times a day. 3/13/18  Yes Eusebio Dejesus MD   lisinopril (PRINIVIL,ZESTRIL) 20 MG tablet Take 1 tablet (20 mg total) by mouth daily. 6/26/17  Yes Eusebio Dejesus MD   metoprolol tartrate (LOPRESSOR) 25 MG tablet TAKE ONE TABLET BY MOUTH TWICE DAILY  7/15/18  Yes Eusebio Dejesus MD   pravastatin (PRAVACHOL) 40 MG tablet  1/17/18  Yes PROVIDER, HISTORICAL       Allergies:   Allergies   Allergen Reactions     Codeine Nausea Only       Immunizations:   Immunization History   Administered Date(s) Administered     Influenza, inj,  historic,unspecified 09/26/2016     Pneumo Polysac 23-V 06/03/2008       Exam Chest clear to auscultation and percussion.  Heart tones regular rhythm without murmur rub or gallop.  Abdomen soft nontender no organomegaly.  No peritoneal signs.  Extremities free of edema cyanosis or clubbing.  Neck veins nondistended no thyromegaly or scleral icterus noted, carotids full.  Skin warm and dry easily conversant good spirited.  Normal intelligence.  Neurologically intact no gross localizing findings.  There is a dense paresis with spasticity right upper extremity right lower extremity with expressive aphasia.    Assessment and Plan  Acute occlusion of left middle cerebral artery from atrial fibrillation and thromboembolic event check lipid panel today.    Codeine allergy.    Hypertension control.  126/74 pulse 71 regular O2 sats 98 percent on room air and respiratory rate 20 and unlabored.    History of atrial fibrillation with thromboembolic complication and occlusion of left middle cerebral artery with resultant profound expressive aphasia and right-sided dense paresis with spasticity.    Seizure disorder Keppra same no recent seizure events reported by patient and her family member.    Started healthcare directive for the patient and family.  Asked them to completely review and to complete and then submitted.    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

## 2021-06-20 NOTE — PROGRESS NOTES
Spoke with patient's daughter Sylwia, she was on her way over to see the patient and discuss her visit yesterday with her and her brother.  The family will review the Advance Care Directive together and get it to the clinic as soon as it is completed and signed.    Patient is not going outside alone and we did discuss getting security cameras outside of the home so they are aware if she leave and that she comes back in and also to monitor any unexpected visitors.  Patient is not wearing her medical alert pendant.     Next outreach due: 9/27/18

## 2021-06-21 NOTE — LETTER
Letter by Eusebio Dejesus MD at      Author: Eusebio Dejesus MD Service: -- Author Type: --    Filed:  Encounter Date: 3/5/2021 Status: (Other)         Nicole Jackson  [facility] [address] [address]             March 5, 2021         Dear Ms. Jackson,    Below are the results from your recent visit:    Resulted Orders   HM2(CBC w/o Differential)   Result Value Ref Range    WBC 6.8 4.0 - 11.0 thou/uL    RBC 3.70 (L) 3.80 - 5.40 mill/uL    Hemoglobin 11.9 (L) 12.0 - 16.0 g/dL    Hematocrit 34.6 (L) 35.0 - 47.0 %    MCV 94 80 - 100 fL    MCH 32.2 27.0 - 34.0 pg    MCHC 34.4 32.0 - 36.0 g/dL    RDW 13.0 11.0 - 14.5 %    Platelets 204 140 - 440 thou/uL    MPV 10.4 (H) 7.0 - 10.0 fL   Comprehensive Metabolic Panel   Result Value Ref Range    Sodium 138 136 - 145 mmol/L    Potassium 4.6 3.5 - 5.0 mmol/L    Chloride 104 98 - 107 mmol/L    CO2 26 22 - 31 mmol/L    Anion Gap, Calculation 8 5 - 18 mmol/L    Glucose 95 70 - 125 mg/dL    BUN 13 8 - 28 mg/dL    Creatinine 0.68 0.60 - 1.10 mg/dL    GFR MDRD Af Amer >60 >60 mL/min/1.73m2    GFR MDRD Non Af Amer >60 >60 mL/min/1.73m2    Bilirubin, Total 0.8 0.0 - 1.0 mg/dL    Calcium 9.3 8.5 - 10.5 mg/dL    Protein, Total 6.6 6.0 - 8.0 g/dL    Albumin 3.9 3.5 - 5.0 g/dL    Alkaline Phosphatase 54 45 - 120 U/L    AST 17 0 - 40 U/L    ALT 12 0 - 45 U/L    Narrative    Fasting Glucose reference range is 70-99 mg/dL per  American Diabetes Association (ADA) guidelines.   Lipase   Result Value Ref Range    Lipase 21 0 - 52 U/L   Urinalysis-UC if Indicated   Result Value Ref Range    Color, UA Yellow Colorless, Yellow, Straw, Light Yellow    Clarity, UA Clear Clear    Glucose, UA Negative Negative    Bilirubin, UA Negative Negative    Ketones, UA Negative Negative    Specific Gravity, UA 1.015 1.005 - 1.030    Blood, UA Negative Negative    pH, UA 7.0 5.0 - 8.0    Protein, UA Negative Negative mg/dL    Urobilinogen, UA 1.0 E.U./dL 0.2 E.U./dL, 1.0 E.U./dL    Nitrite,  UA Negative Negative    Leukocytes, UA Trace (!) Negative    Bacteria, UA Few (!) None Seen hpf    RBC, UA 0-2 None Seen, 0-2 hpf    WBC, UA 0-5 None Seen, 0-5 hpf    Squam Epithel, UA 0-5 None Seen, 0-5 lpf    Mucus, UA Few (!) None Seen lpf    Narrative    Urine Culture ordered based on [facility]' criteria       All very good results Urine culture came back good- no growth    Please call with questions or contact us using [employer].    Sincerely,        Electronically signed by Eusebio Dejesus MD

## 2021-06-21 NOTE — LETTER
Letter by Eusebio Dejesus MD at      Author: Eusebio Dejesus MD Service: -- Author Type: --    Filed:  Encounter Date: 11/3/2020 Status: (Other)         Nicole Jackson  [address] [address]             November 3, 2020         Dear Ms. Jackson,    Below are the results from your recent visit:    Resulted Orders   XR Chest 2 Views    Narrative    EXAM: XR CHEST 2 VIEWS  LOCATION: [facility]  DATE/TIME: 11/3/2020 9:14 AM    INDICATION: Cerebral infarction due to embolism of unspecified middle cerebral artery  COMPARISON: 08/30/2018.      Impression    Lungs are clear of focal consolidation. Heart size is normal. The pulmonary vascular congestion seen on the prior study has resolved. There are multiple bilateral old healed rib fractures. No acute bony fracture. Probable small retrocardiac   hiatal hernia.       All clear on the chest x-ray.  This is good news.     Please call with questions or contact us using [employer].    Sincerely,        Electronically signed by Eusebio Dejesus MD

## 2021-06-21 NOTE — PROGRESS NOTES
Attempt 2-Care Guide called patient.  If this patient is returning our call please transfer to [place] at ext. [phone].    Next outreach due: 10/29/18

## 2021-06-21 NOTE — PROGRESS NOTES
Spoke with patient's daughter Sylwia  Patient is doing well at home, she is not wearing her Life Alert. She is left unattended for about 4 hours on weekdays.   The family figures she will run out of money for home care sometime next fall, they would like to start looking at assisted living facilities around July.  Care guide encouraged them to look sooner since many places have waiting lists.   Brochure mailed for [employer] for assistance in setting up tours.    Next outreach due: 12/11/18

## 2021-06-21 NOTE — LETTER
Letter by Eusebio Dejesus MD at      Author: Eusebio Dejesus MD Service: -- Author Type: --    Filed:  Encounter Date: 11/4/2020 Status: (Other)         Nicole Jackson  [address] [place] [id number]             November 4, 2020         Dear Ms. Jackson,    Below are the results from your recent visit:    Resulted Orders   Comprehensive Metabolic Panel   Result Value Ref Range    Sodium 140 136 - 145 mmol/L    Potassium 4.5 3.5 - 5.0 mmol/L    Chloride 107 98 - 107 mmol/L    CO2 25 22 - 31 mmol/L    Anion Gap, Calculation 8 5 - 18 mmol/L    Glucose 91 70 - 125 mg/dL    BUN 18 8 - 28 mg/dL    Creatinine 0.93 0.60 - 1.10 mg/dL    GFR MDRD Af Amer >60 >60 mL/min/1.73m2    GFR MDRD Non Af Amer 57 (L) >60 mL/min/1.73m2    Bilirubin, Total 1.0 0.0 - 1.0 mg/dL    Calcium 9.4 8.5 - 10.5 mg/dL    Protein, Total 7.0 6.0 - 8.0 g/dL    Albumin 4.1 3.5 - 5.0 g/dL    Alkaline Phosphatase 64 45 - 120 U/L    AST 29 0 - 40 U/L    ALT 30 0 - 45 U/L    Narrative    Fasting Glucose reference range is 70-99 mg/dL per  American Diabetes Association (ADA) guidelines.   Lipid Cascade   Result Value Ref Range    Cholesterol 158 <=199 mg/dL    Triglycerides 69 <=149 mg/dL    HDL Cholesterol 70 >=50 mg/dL    LDL Calculated 74 <=129 mg/dL    Patient Fasting > 8hrs? Yes        All very good results     Please call with questions or contact us using "[facility]".    Sincerely,        Electronically signed by Eusebio Dejesus MD

## 2021-06-22 NOTE — PROGRESS NOTES
Attempt 2-Care Guide called patient.  If this patient is returning our call please transfer to [place] at ext. [phone].     Next outreach due: 12/26/18

## 2021-06-22 NOTE — PROGRESS NOTES
Attempt 1-Care Guide called patient.  If this patient is returning our call please transfer to [place] at ext. [phone]    Next outreach due: 12/18/18

## 2021-06-22 NOTE — PROGRESS NOTES
Office Visit - Follow up    Nicole Jackson   83 y.o. 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  Expressive aphasia.  Back itches no rash but skin is dry Lubriderm suggested.    History of atrial fibrillation associated with embolic phenomenon to the left middle cerebral artery.    The patient has had a history of hypertension and coronary artery disease as well.  No chest pain or shortness of breath no blood in stool or urine.  The patient stroke was 3 years prior a question whether or not speech therapy would be helpful or physical therapy she has a dense paresis involving the right upper and right lower extremity with edema on the right lower extremity.  The patient wears a brace and has spasticity associated.  There has been no recent seizure disorder.    No blood in stool or urine medication list reviewed well-tolerated normal effects no definite chest pain or shortness of breath although her expressive aphasia limits the history here her daughter accompanies her here today and is helpful.  Very supportive.    ROS: A comprehensive review of systems was performed and was otherwise negative    Medications:  Prior to Admission medications    Medication Sig Start Date End Date Taking? Authorizing Provider   apixaban (ELIQUIS) 5 mg Tab tablet Take 1 tablet (5 mg total) by mouth 2 (two) times a day. 5/12/18  Yes Eusebio Dejesus MD   levETIRAcetam (KEPPRA) 250 MG tablet Take 1 tablet (250 mg total) by mouth 2 (two) times a day. 3/13/18  Yes Eusebio Dejesus MD   lisinopril (PRINIVIL,ZESTRIL) 20 MG tablet Take 1 tablet (20 mg total) by mouth daily. 6/26/17  Yes Eusebio Dejesus MD   metoprolol tartrate (LOPRESSOR) 25 MG tablet TAKE ONE TABLET BY MOUTH TWICE DAILY  7/15/18  Yes Eusebio Dejesus MD   pravastatin (PRAVACHOL) 40 MG  tablet Take 40 mg by mouth at bedtime.   Yes PROVIDER, HISTORICAL   atorvastatin (LIPITOR) 20 MG tablet Take 1 tablet (20 mg total) by mouth at bedtime. 9/11/18 1/4/19  Eusebio Dejesus MD       Allergies:   Allergies   Allergen Reactions     Codeine Nausea Only       Immunizations:   Immunization History   Administered Date(s) Administered     Influenza, inj, historic,unspecified 09/26/2016     Pneumo Polysac 23-V 06/03/2008       Exam Chest clear to auscultation and percussion.  Heart tones regular rhythm without murmur rub or gallop.  Abdomen soft nontender no organomegaly.  No peritoneal signs.  Extremities free of edema cyanosis or clubbing.  Neck veins nondistended no thyromegaly or scleral icterus noted, carotids full.  Skin warm and dry easily conversant good spirited.  Normal intelligence.  Neurologically intact no gross localizing findings.  Dense paresis involving the right lower extremity and right upper extremity there is a brace on the right lower extremity and edema is present there is spasticity with the upper extremity and the patient has expressive aphasia.  It limits the history here today.  Daughter is present and helpful.    Allergy codeine nausea without emesis.    Assessment and Plan  CVA due to embolism to left middle cerebral artery.    Atrial fibrillation.    Dry skin suggest Lubriderm.    Codeine allergy.    Hypertension controlled.  134/70 pulse 58 respirations 18 O2 sats 98% pulse seems regular today apically.    RTC 3 months time fasting office visit with labs.  If patient and family wish to have resumption of speech therapy and physical therapy please call off for both today patient declined.  Also occupational therapy declined at this juncture.    Time: total time spent with the patient was 25 minutes of which >50% was spent in counseling and coordination of care    The following high BMI interventions were performed this visit: encouragement to exercise    Eusebio Dejesus,  MD    Patient Active Problem List   Diagnosis     Cataract     Carpal Tunnel Syndrome     Choledocholithiasis     Cholecystitis     Cerebral infarction due to embolism of middle cerebral artery (H)     Essential hypertension     Atrial fibrillation (H)     Macular degeneration     Primary osteoarthritis involving multiple joints     Chronic low back pain     GERD (gastroesophageal reflux disease)     Breast cancer (H)     IBS (irritable bowel syndrome)     Colon polyp     Acute headache     Vertigo     Seizure (H)

## 2021-06-22 NOTE — PROGRESS NOTES
Care Guide called patient.  If this patient is returning our call please transfer to [place] at ext. [phone].   I have called (patient) 3 times over the past two weeks and have been unsuccessful in reaching them. This patient has also not returned any of my messages.     I will continue attempting to reach out to this patient in one month. I will also check this patient s chart for upcoming appointments, ER reports that may contain a new phone number, or any other recent activity.     Next outreach due: 1/24/19

## 2021-06-23 NOTE — PROGRESS NOTES
Care Guide called patient.  If this patient is returning my call, please transfer to [place] at ext [phone].  I have called Nicole and have been unsuccessful in reaching this patient for 2 months now.  This patient has also not returned any of my messages.  I will continue attempting to reach out to this patient in one month.  I will also check this patient's chart for upcoming appointments, ER reports that may contain a new phone number, or any other recent activity.     Next outreach due: 2/27/19

## 2021-06-24 NOTE — TELEPHONE ENCOUNTER
Spoke with Sylwia, the patient's daughter, and relayed message below from Dr. Dejesus.  She verbalized understanding and had no further questions at this time.  Quynh JARVIS, CELIA/COBY....................2:32 PM

## 2021-06-24 NOTE — PROGRESS NOTES
PT:  Patient has been receiving botox for her feet to help with her ability to walk.     Advance Care Directive:   Reviewed chart to find that ACD is not valid due to it not being signed by the patient.  [facility] and also scheduled patient's daughter a phone visit with [facility] to discuss.    Life Alert:  Patient continues to not wear her Life Alert     Next outreach due: 3/15/19

## 2021-06-24 NOTE — TELEPHONE ENCOUNTER
Dr. Dejesus,  Please review message below and advise if okay for orders requested below.  Thank you.  Quynh JARVIS, CELIA/COBY....................1:49 PM

## 2021-06-24 NOTE — TELEPHONE ENCOUNTER
Orders being requested: physical therapy  Reason service is needed/diagnosis: Atrophy in feet per Sylwia, patient's daughter. Caller stated patient usually wears a brace to help with this but has not worn it in the last 2 weeks due to her atrophy in her toes. Caller stated patient's toes are curled up so much that she won't wear the brace and can't put on her shoes. Caller is questioning if PT can be ordered to see if this will help patient.  When are orders needed by: as soon as possible   Where to send Orders: Carol Ann Sales at [facility]  Okay to leave detailed message?  No  [phone]

## 2021-06-24 NOTE — PROGRESS NOTES
Message left for Lily to discuss healthcare directive for pt.  Left care guide contact information for rescheduling if needed.

## 2021-06-25 NOTE — PROGRESS NOTES
Attempt 1-Care Guide called patient.  If this patient is returning our call please transfer to [place] at ext. [phone]    Next outreach due: 4/3/19

## 2021-06-26 NOTE — PROGRESS NOTES
"    Assessment & Plan     Hypertension controlled 110/52.  Check today comprehensive metabolic profile lipid panel.    Atrial fibrillation with thromboembolic complication and occlusion of the left middle cerebral artery.  Status post stroke syndrome with expressive aphasia spasticity and wheelchair-bound.  Accompanied today by her daughter.    History of atrial fibrillation complicated by thromboembolic complications a stroke thrombotic embolism in nature involving the left middle cerebral artery.  Significant neurologic residual.  After 5 years.  Persistent.  Now on Eliquis 2.5 mg twice daily.  Rate is controlled 70.    Review of external notes as documented in note  25 minutes spent on the date of the encounter doing chart review, patient visit and documentation        No follow-ups on file.    Eusebio Dejesus MD  [facility]   Nicole Jackson is 85 y.o. and presents today for the following health issues   HPI     Right foot toes bother seem swollen.  Not on amlodipine.  Cardiology gave her HCTZ but the patient stopped because of urinary frequency.  HCTZ dose 12.5 mg daily.        Review of Systems  No blood in stool or urine no chest pain shortness of breath medication list reviewed reconciled.  She is wheelchair-bound non-smoker no excess alcohol.  Accompanied by her daughter Lily      Objective    /52 (Patient Site: Left Arm, Patient Position: Sitting)   Pulse 70   Temp 97.9  F (36.6  C)   Ht 5' 2\" (1.575 m)   Wt 113 lb (51.3 kg)   SpO2 96%   BMI 20.67 kg/m    Body mass index is 20.67 kg/m .  Physical Exam  Expressive aphasia is noted some element of cognitive aphasia also appreciated.  Chest is clear heart tones are regular abdomen benign extremities free of edema on the left trace edema noted right neck veins nondistended no thyromegaly no carotid bruits.  Expressive aphasia is limited to just a few yeses perhaps nose.  She is happy conversant in her own " way and accompanied by her daughter Lily who is very supportive.

## 2021-07-03 ENCOUNTER — HEALTH MAINTENANCE LETTER (OUTPATIENT)
Age: 86
End: 2021-07-03

## 2021-07-04 NOTE — LETTER
Letter by Eusebio Dejesus MD at      Author: Eusebio Dejesus MD Service: -- Author Type: --    Filed:  Encounter Date: 6/7/2021 Status: (Other)         Nicole Jackson  [address] [place] [address]             June 7, 2021         Dear Ms. Jackson,    Below are the results from your recent visit:    Resulted Orders   Comprehensive Metabolic Panel   Result Value Ref Range    Sodium 138 136 - 145 mmol/L    Potassium 4.5 3.5 - 5.0 mmol/L    Chloride 106 98 - 107 mmol/L    CO2 21 (L) 22 - 31 mmol/L    Anion Gap, Calculation 11 5 - 18 mmol/L    Glucose 104 70 - 125 mg/dL    BUN 11 8 - 28 mg/dL    Creatinine 0.76 0.60 - 1.10 mg/dL    GFR MDRD Af Amer >60 >60 mL/min/1.73m2    GFR MDRD Non Af Amer >60 >60 mL/min/1.73m2    Bilirubin, Total 0.7 0.0 - 1.0 mg/dL    Calcium 9.2 8.5 - 10.5 mg/dL    Protein, Total 6.7 6.0 - 8.0 g/dL    Albumin 3.9 3.5 - 5.0 g/dL    Alkaline Phosphatase 55 45 - 120 U/L    AST 18 0 - 40 U/L    ALT 12 0 - 45 U/L    Narrative    Fasting Glucose reference range is 70-99 mg/dL per  American Diabetes Association (ADA) guidelines.   Lipid Cascade   Result Value Ref Range    Cholesterol 145 <=199 mg/dL    Triglycerides 54 <=149 mg/dL    HDL Cholesterol 61 >=50 mg/dL    LDL Calculated 73 <=129 mg/dL    Patient Fasting > 8hrs? Unknown        All very good results    Please call with questions or contact us using [employer].    Sincerely,        Electronically signed by Eusebio Dejesus MD

## 2021-07-06 VITALS
SYSTOLIC BLOOD PRESSURE: 110 MMHG | WEIGHT: 113 LBS | OXYGEN SATURATION: 96 % | DIASTOLIC BLOOD PRESSURE: 52 MMHG | HEIGHT: 62 IN | HEART RATE: 70 BPM | TEMPERATURE: 97.9 F | BODY MASS INDEX: 20.8 KG/M2

## 2021-07-16 ENCOUNTER — NURSE TRIAGE (OUTPATIENT)
Dept: NURSING | Facility: CLINIC | Age: 86
End: 2021-07-16

## 2021-07-17 NOTE — TELEPHONE ENCOUNTER
"Caller is daughter ; now with patient;   Reporting patient has had stomach pains all week and has refused to be sen but has  Stomach pain  \"often ' pr daughter. Reporting tonight  Patient ha a temp of 99.4 an  Refused to get undressed  For bed because her stomach hurt too much   Daughter ha no specific  information to complete triage; is  On her way to be with patient who is currently with her son   Advised that if patient is in acute  ain or  Appears  [facility]   If seeking further advisement return call when with patient for more complete triage   Daughter understands and will comply   Sylvia Viera RN  [facility]    Reason for Disposition    [1] Caller is not with the adult (patient) AND [2] reporting urgent symptoms    Protocols used: INFORMATION ONLY CALL - NO TRIAGE-A-      "

## 2021-10-15 DIAGNOSIS — R56.9 SEIZURE (H): ICD-10-CM

## 2021-10-15 RX ORDER — LEVETIRACETAM 250 MG/1
TABLET ORAL
Qty: 180 TABLET | Refills: 0 | Status: SHIPPED | OUTPATIENT
Start: 2021-10-15 | End: 2021-11-12

## 2021-10-15 NOTE — TELEPHONE ENCOUNTER
Routing refill request to provider for review/approval because:  Drug not on the [facility] refill protocol     Last Written Prescription Date:  1/7/21  Last Fill Quantity: 180,  # refills: 2   Last office visit provider:  6/4/21     Requested Prescriptions   Pending Prescriptions Disp Refills     levETIRAcetam (KEPPRA) 250 MG tablet [Pharmacy Med Name: levETIRAcetam Oral Tablet 250 MG] 180 tablet 0     Sig: TAKE 1 TABLET (250 MG TOTAL) BY MOUTH 2 (TWO) TIMES A DAY.       There is no refill protocol information for this order          phu arias RN 10/15/21 3:31 PM

## 2021-10-23 ENCOUNTER — HEALTH MAINTENANCE LETTER (OUTPATIENT)
Age: 86
End: 2021-10-23

## 2021-11-12 ENCOUNTER — OFFICE VISIT (OUTPATIENT)
Dept: INTERNAL MEDICINE | Facility: CLINIC | Age: 86
End: 2021-11-12
Payer: MEDICARE

## 2021-11-12 VITALS
SYSTOLIC BLOOD PRESSURE: 124 MMHG | BODY MASS INDEX: 20.61 KG/M2 | OXYGEN SATURATION: 97 % | HEIGHT: 62 IN | DIASTOLIC BLOOD PRESSURE: 70 MMHG | HEART RATE: 65 BPM | WEIGHT: 112 LBS

## 2021-11-12 DIAGNOSIS — I63.419 CEREBRAL INFARCTION DUE TO EMBOLISM OF MIDDLE CEREBRAL ARTERY, UNSPECIFIED BLOOD VESSEL LATERALITY (H): ICD-10-CM

## 2021-11-12 DIAGNOSIS — R56.9 SEIZURE (H): ICD-10-CM

## 2021-11-12 LAB
CHOLEST SERPL-MCNC: 148 MG/DL
FASTING STATUS PATIENT QL REPORTED: NORMAL
HDLC SERPL-MCNC: 67 MG/DL
LDLC SERPL CALC-MCNC: 70 MG/DL
TRIGL SERPL-MCNC: 53 MG/DL

## 2021-11-12 PROCEDURE — 80061 LIPID PANEL: CPT | Performed by: INTERNAL MEDICINE

## 2021-11-12 PROCEDURE — 91306 COVID-19,PF,MODERNA (18+ YRS BOOSTER .25ML): CPT | Performed by: INTERNAL MEDICINE

## 2021-11-12 PROCEDURE — 99214 OFFICE O/P EST MOD 30 MIN: CPT | Mod: 25 | Performed by: INTERNAL MEDICINE

## 2021-11-12 PROCEDURE — 36415 COLL VENOUS BLD VENIPUNCTURE: CPT | Performed by: INTERNAL MEDICINE

## 2021-11-12 PROCEDURE — G0008 ADMIN INFLUENZA VIRUS VAC: HCPCS | Performed by: INTERNAL MEDICINE

## 2021-11-12 PROCEDURE — 90662 IIV NO PRSV INCREASED AG IM: CPT | Performed by: INTERNAL MEDICINE

## 2021-11-12 PROCEDURE — 0064A COVID-19,PF,MODERNA (18+ YRS BOOSTER .25ML): CPT | Performed by: INTERNAL MEDICINE

## 2021-11-12 RX ORDER — METOPROLOL TARTRATE 25 MG/1
25 TABLET, FILM COATED ORAL 2 TIMES DAILY
Qty: 180 TABLET | Refills: 3 | Status: SHIPPED | OUTPATIENT
Start: 2021-11-12

## 2021-11-12 RX ORDER — LEVETIRACETAM 250 MG/1
TABLET ORAL
Qty: 180 TABLET | Refills: 3 | Status: SHIPPED | OUTPATIENT
Start: 2021-11-12 | End: 2022-01-01

## 2021-11-12 RX ORDER — LISINOPRIL 40 MG/1
40 TABLET ORAL DAILY
Qty: 90 TABLET | Refills: 3 | Status: SHIPPED | OUTPATIENT
Start: 2021-11-12

## 2021-11-12 RX ORDER — PRAVASTATIN SODIUM 40 MG
40 TABLET ORAL AT BEDTIME
Qty: 90 TABLET | Refills: 3 | Status: SHIPPED | OUTPATIENT
Start: 2021-11-12

## 2021-11-12 ASSESSMENT — MIFFLIN-ST. JEOR: SCORE: 901.28

## 2021-11-12 NOTE — LETTER
November 15, 2021      Nicole Jackson  [address]  [facility] [address]        Dear ,    We are writing to inform you of your test results.    All good!       Resulted Orders   Lipid panel reflex to direct LDL Fasting   Result Value Ref Range    Cholesterol 148 <=199 mg/dL    Triglycerides 53 <=149 mg/dL    Direct Measure HDL 67 >=50 mg/dL      Comment:      HDL Cholesterol Reference Range:     0-2 years:   No reference ranges established for patients under 2 years old  at [facility] for lipid analytes.    2-8 years:  Greater than 45 mg/dL     18 years and older:   Female: Greater than or equal to 50 mg/dL   Male:   Greater than or equal to 40 mg/dL    LDL Cholesterol Calculated 70 <=129 mg/dL    Patient Fasting > 8hrs? Unknown        If you have any questions or concerns, please call the clinic at the number listed above.       Sincerely,      Eusebio Dejesus MD

## 2021-11-12 NOTE — PROGRESS NOTES
"Assessment/Plan:    Hypertension controlled 124/70.  Minor cough that she tolerates well on lisinopril 40 mg daily discussed.  Patient wished to continue lisinopril because of excellent blood pressure control.  124/70.    History of stroke secondary to atrial fibrillation and thromboembolic complication.  Continue Eliquis age-adjusted dose 2.5 mg twice daily plus give Covid vaccine Materna previously received J&J vaccine March 2021.  In addition flu vaccine today.    Seizure disorder secondary to stroke no seizures of late continue Keppra same.    25 minutes spent on the date of the encounter doing chart review, interpretation of tests, patient visit and discussion with family     Subjective:  Nicole Jackson is a 86 year old female presents for the following health issues accompanied today by her daughter the patient offers no new complaints she generally feels well she is suffered from a severe expressive aphasia from thromboembolic complications atrial fibrillation with clot to the distribution of the left middle cerebral artery she is right-hand dominant.  She requested flu shot today with the Covid vaccine previously received J&J vaccine March 2021.  Okay for Materna vaccine today.    ROS:  No blood in stool or urine no chest pain or shortness of breath medication list reviewed reconciled.  Non-smoker no excess alcohol.  Allergies codeine nausea.    Objective:  /70 (BP Location: Right arm, Patient Position: Sitting)   Pulse 65   Ht 1.575 m (5' 2\")   Wt 50.8 kg (112 lb)   SpO2 97%   BMI 20.49 kg/m    Wheelchair-bound dense paresthesias right side no leg edema neck veins are nondistended there are no carotid bruits lungs are clear diminished breath sounds are noted heart tones are irregularly irregular abdomen benign soft nontender no liver or spleen tip palpable.  Color was good she is accompanied by her daughter who is very supportive.  "

## 2022-01-01 ENCOUNTER — LAB REQUISITION (OUTPATIENT)
Dept: LAB | Facility: CLINIC | Age: 87
End: 2022-01-01
Payer: MEDICARE

## 2022-01-01 ENCOUNTER — TELEPHONE (OUTPATIENT)
Dept: INTERNAL MEDICINE | Facility: CLINIC | Age: 87
End: 2022-01-01

## 2022-01-01 ENCOUNTER — MEDICAL CORRESPONDENCE (OUTPATIENT)
Dept: HEALTH INFORMATION MANAGEMENT | Facility: CLINIC | Age: 87
End: 2022-01-01

## 2022-01-01 ENCOUNTER — HEALTH MAINTENANCE LETTER (OUTPATIENT)
Age: 87
End: 2022-01-01

## 2022-01-01 ENCOUNTER — NURSE TRIAGE (OUTPATIENT)
Dept: NURSING | Facility: CLINIC | Age: 87
End: 2022-01-01

## 2022-01-01 DIAGNOSIS — I10 ESSENTIAL (PRIMARY) HYPERTENSION: ICD-10-CM

## 2022-01-01 DIAGNOSIS — R56.9 SEIZURE (H): ICD-10-CM

## 2022-01-01 LAB
ANION GAP SERPL CALCULATED.3IONS-SCNC: 8 MMOL/L (ref 7–15)
BASOPHILS # BLD AUTO: 0 10E3/UL (ref 0–0.2)
BASOPHILS NFR BLD AUTO: 1 %
BUN SERPL-MCNC: 9.5 MG/DL (ref 8–23)
CALCIUM SERPL-MCNC: 8.7 MG/DL (ref 8.8–10.2)
CHLORIDE SERPL-SCNC: 106 MMOL/L (ref 98–107)
CREAT SERPL-MCNC: 0.64 MG/DL (ref 0.51–0.95)
DEPRECATED HCO3 PLAS-SCNC: 25 MMOL/L (ref 22–29)
EOSINOPHIL # BLD AUTO: 0 10E3/UL (ref 0–0.7)
EOSINOPHIL NFR BLD AUTO: 1 %
ERYTHROCYTE [DISTWIDTH] IN BLOOD BY AUTOMATED COUNT: 14.5 % (ref 10–15)
GFR SERPL CREATININE-BSD FRML MDRD: 85 ML/MIN/1.73M2
GLUCOSE SERPL-MCNC: 95 MG/DL (ref 70–99)
HCT VFR BLD AUTO: 32 % (ref 35–47)
HGB BLD-MCNC: 10.5 G/DL (ref 11.7–15.7)
IMM GRANULOCYTES # BLD: 0.1 10E3/UL
IMM GRANULOCYTES NFR BLD: 1 %
LYMPHOCYTES # BLD AUTO: 1.9 10E3/UL (ref 0.8–5.3)
LYMPHOCYTES NFR BLD AUTO: 22 %
MCH RBC QN AUTO: 31.5 PG (ref 26.5–33)
MCHC RBC AUTO-ENTMCNC: 32.8 G/DL (ref 31.5–36.5)
MCV RBC AUTO: 96 FL (ref 78–100)
MONOCYTES # BLD AUTO: 1.3 10E3/UL (ref 0–1.3)
MONOCYTES NFR BLD AUTO: 15 %
NEUTROPHILS # BLD AUTO: 5.3 10E3/UL (ref 1.6–8.3)
NEUTROPHILS NFR BLD AUTO: 60 %
NRBC # BLD AUTO: 0 10E3/UL
NRBC BLD AUTO-RTO: 0 /100
PLATELET # BLD AUTO: 257 10E3/UL (ref 150–450)
POTASSIUM SERPL-SCNC: 4.1 MMOL/L (ref 3.4–5.3)
RBC # BLD AUTO: 3.33 10E6/UL (ref 3.8–5.2)
SODIUM SERPL-SCNC: 139 MMOL/L (ref 136–145)
WBC # BLD AUTO: 8.7 10E3/UL (ref 4–11)

## 2022-01-01 PROCEDURE — 36415 COLL VENOUS BLD VENIPUNCTURE: CPT | Performed by: INTERNAL MEDICINE

## 2022-01-01 PROCEDURE — 85014 HEMATOCRIT: CPT | Performed by: INTERNAL MEDICINE

## 2022-01-01 PROCEDURE — 85041 AUTOMATED RBC COUNT: CPT | Performed by: INTERNAL MEDICINE

## 2022-01-01 PROCEDURE — 80048 BASIC METABOLIC PNL TOTAL CA: CPT | Performed by: INTERNAL MEDICINE

## 2022-01-01 PROCEDURE — P9603 ONE-WAY ALLOW PRORATED MILES: HCPCS | Performed by: INTERNAL MEDICINE

## 2022-01-01 RX ORDER — LEVETIRACETAM 250 MG/1
TABLET ORAL
Qty: 180 TABLET | Refills: 0 | Status: SHIPPED | OUTPATIENT
Start: 2022-01-01 | End: 2023-01-01

## 2022-04-21 ENCOUNTER — OFFICE VISIT (OUTPATIENT)
Dept: INTERNAL MEDICINE | Facility: CLINIC | Age: 87
End: 2022-04-21
Payer: MEDICARE

## 2022-04-21 VITALS
SYSTOLIC BLOOD PRESSURE: 134 MMHG | OXYGEN SATURATION: 96 % | BODY MASS INDEX: 20.24 KG/M2 | TEMPERATURE: 97.8 F | WEIGHT: 110 LBS | DIASTOLIC BLOOD PRESSURE: 74 MMHG | HEART RATE: 66 BPM | HEIGHT: 62 IN | RESPIRATION RATE: 17 BRPM

## 2022-04-21 DIAGNOSIS — I10 ESSENTIAL HYPERTENSION: Primary | ICD-10-CM

## 2022-04-21 PROCEDURE — 99213 OFFICE O/P EST LOW 20 MIN: CPT | Performed by: INTERNAL MEDICINE

## 2022-04-21 NOTE — PROGRESS NOTES
"Assessment/Plan:    Hypertension controlled 134/74.    Dense stroke thromboembolic complication of atrial fibrillation with right-sided paresthesias wheelchair-bound.    Skin lesions needs dermatology consult [facility]  Daughter present very supportive    15 minutes spent on the date of the encounter doing chart review, patient visit, documentation and discussion with family     Subjective:  Nicole Jackson is a 86 year old female presents for the following health issues as above.    ROS:  No blood in stool or urine no chest pain shortness of breath.  Aphasic speech    Objective:  /74 (BP Location: Right arm, Patient Position: Sitting)   Pulse 66   Temp 97.8  F (36.6  C)   Resp 17   Ht 1.575 m (5' 2\")   Wt 49.9 kg (110 lb)   SpO2 96%   BMI 20.12 kg/m    Aphasic speech chest clear neck veins nondistended no carotid bruits heart tones regular rhythm abdomen benign extremities free of edema cyanosis or clubbing skin lesions may be actinic keratosis or early basal cell or dry skin.  Suggest Derm consult as outlined above    Eusebio Dejesus MD  Internal Medicine    Answers for HPI/ROS submitted by the patient on 4/21/2022  How many servings of fruits and vegetables do you eat daily?: 2-3  On average, how many sweetened beverages do you drink each day (Examples: soda, juice, sweet tea, etc.  Do NOT count diet or artificially sweetened beverages)?: 1  How many minutes a day do you exercise enough to make your heart beat faster?: 10 to 19  How many days a week do you exercise enough to make your heart beat faster?: 5  How many days per week do you miss taking your medication?: 0  What is the reason for your visit today?: Check up      "

## 2022-11-14 NOTE — TELEPHONE ENCOUNTER
S[place]en calling to request verbal orders for the following:    Skilled nursinx per week for 4 weeks.  For pain monitoring and fall prevention.    Home health aide: 1x per week for 4 weeks.    PT eval  OT eval  Speech eval    Please call Shereen at [phone].  Secure VM if needed.

## 2022-11-14 NOTE — TELEPHONE ENCOUNTER
The Home Care/Assisted Living/Nursing Facility is calling regarding an established patient.  Has the patient seen Home Care in the past or is currently residing in Assisted Living or Nursing Facility? Yes.     Shereen calling from [facility] requesting the following orders that are within the Home Care, Assisted Living or Nursing Home Eval and Treatment standing order and can be signed as standing order signature required by RN.    Preferred Call Back Number: [phone]    Home Care Visits Continuation and PT/OT/Speech Therapy    Any additional Orders:  Are there any orders requested, not stated above, that are outside of the standing order and must be routed to a licensed practitioner for approval?    No    Writer has verified Requestor will send fax to have orders signed.

## 2022-11-15 NOTE — TELEPHONE ENCOUNTER
Reason for Call:  Home Health Care    Elissa with [facility] called regarding (reason for call): verbal orders    Orders are needed for this patient.   PT: 1 visit per week for 4 weeks    Pt Provider: Dr. Dejesus    Phone Number Homecare Nurse can be reached at: [phone]    Can we leave a detailed message on this number? YES voice mail is secure and confidential    Call taken on 11/15/2022 at 4:31 PM by Tana Fraire

## 2022-11-16 NOTE — TELEPHONE ENCOUNTER
1 more OT visit, for re assessment on  December 9th     Kelsey Patricia   [phone]  Orders for OT

## 2022-11-16 NOTE — TELEPHONE ENCOUNTER
The Home Care/Assisted Living/Nursing Facility is calling regarding an established patient.  Has the patient seen Home Care in the past or is currently residing in Assisted Living or Nursing Facility? Yes.     Belem calling from [facility] requesting the following orders that are within the Home Care, Assisted Living or Nursing Home Eval and Treatment standing order and can be signed as standing order signature required by RN.    Preferred Call Back Number: [phone]    PT/OT/Speech Therapy    Any additional Orders:  Are there any orders requested, not stated above, that are outside of the standing order and must be routed to a licensed practitioner for approval?    No    Writer has verified Requestor will send fax to have orders signed.

## 2022-11-18 NOTE — TELEPHONE ENCOUNTER
Elissa calling to check status of below request as she has appt with patient on Monday 11/21/22 and has not heard back yet.  Writer notes no visible notes in chart to indicate if orders have been approved or not.  Please review and contact Elissa as soon as possible.  She can be reached at [phone]. Secure VM if needed.

## 2022-11-18 NOTE — TELEPHONE ENCOUNTER
The Home Care/Assisted Living/Nursing Facility is calling regarding an established patient.  Has the patient seen Home Care in the past or is currently residing in Assisted Living or Nursing Facility? Yes.     Elissa calling from [facility] requesting the following orders that are within the Home Care, Assisted Living or Nursing Home Eval and Treatment standing order and can be signed as standing order signature required by RN.    Preferred Call Back Number: [phone]    PT/OT/Speech Therapy    Any additional Orders:  Are there any orders requested, not stated above, that are outside of the standing order and must be routed to a licensed practitioner for approval?    No    Writer has verified Requestor will send fax to have orders signed.

## 2022-11-22 NOTE — TELEPHONE ENCOUNTER
[facility] home health care RN calling with questions about;  Dtr is also present during this call.    States Pt suffered fracture of right humorous.  Lower right arm swelling has gottten better in last few days but upper arm swelling has increased.  Pain/discomfort also increased in upper right arm    Pt Denies;  Intolerable pain  Fever  Numbness in arm, hand, fingers    According to the protocol, patient should see a physician or HCP within 3 days. Dtr states she has an appt for Pt to see provider on 11/23/22.   Care advice given.   Home Care RN verbalizes understanding and agrees with plan of care.     Nella Iglesias RN, Nurse Advisor 10:47 PM 11/22/2022    Reason for Disposition    Injury is still painful or swollen after 2 weeks    Additional Information    Negative: Major bleeding (actively dripping or spurting) that can't be stopped    Negative: Serious injury with multiple fractures (broken bones)    Negative: Sounds like a life-threatening emergency to the triager    Negative: Wound looks infected    Negative: Arm pain from overuse (e.g., sports, lifting, physical work)    Negative: Arm pain not from an injury    Negative: Shoulder injury is main concern    Negative: Elbow injury is main concern    Negative: Hand or wrist injury is main concern    Negative: Bullet wound, stabbed by knife, or other serious penetrating wound    Negative: Looks like a broken bone or dislocated joint (crooked or deformed)    Negative: Can't move injured arm at all    Negative: Bleeding won't stop after 10 minutes of direct pressure (using correct technique)    Negative: Skin is split open or gaping (or length > 1/2 inch or 12 mm)    Negative: Dirt in the wound and not removed after 15 minutes of scrubbing    Negative: Sounds like a serious injury to the triager    Negative: SEVERE pain    Negative: Can't move injured arm normally (bend or straighten completely)    Negative: Large swelling or bruise and size > palm of person's  hand    Negative: No prior tetanus shots (or is not fully vaccinated) and any wound (e.g., cut or scrape)    Negative: HIV positive or severe immunodeficiency (severely weak immune system) and DIRTY cut or scrape    Negative: Injury interferes with work or school    Negative: High-risk adult (e.g., age > 60 years, osteoporosis, chronic steroid use)    Negative: Suspicious history for the injury    Negative: Last tetanus shot > 5 years ago and DIRTY cut or scrape    Negative: Last tetanus shot > 10 years ago and CLEAN cut or scrape    Negative: Patient wants to be seen    Negative: Injury and pain has not improved after 3 days    Protocols used: ARM INJURY-A-OH

## 2022-11-28 NOTE — TELEPHONE ENCOUNTER
Reason for Call:  Home Health Care    Leisa with [facility] called regarding (reason for call): Verbal Orders     Orders are needed for this patient. Speech Therapy  1 visit per week for 4 weeks. Swallowing, safety and communication device     Pt Provider: Dr. Dejesus    Phone Number Homecare Nurse can be reached at: [phone]    Can we leave a detailed message on this number? YES    Call taken on 11/28/2022 at 3:40 PM by Tana Fraire

## 2022-11-28 NOTE — TELEPHONE ENCOUNTER
The Home Care/Assisted Living/Nursing Facility is calling regarding an established patient.  Has the patient seen Home Care in the past or is currently residing in Assisted Living or Nursing Facility? Yes.     Leisa calling from [facility] requesting the following orders that are within the Home Care, Assisted Living or Nursing Home Eval and Treatment standing order and can be signed as standing order signature required by RN.    Preferred Call Back Number: [phone]    PT/OT/Speech Therapy    Any additional Orders:  Are there any orders requested, not stated above, that are outside of the standing order and must be routed to a licensed practitioner for approval?    No    Writer has verified Requestor will send fax to have orders signed.

## 2022-12-05 NOTE — TELEPHONE ENCOUNTER
Reason for Call:  Home Health Care    Elissa PT with [facility] called regarding (reason for call): Verbal Orders    Orders are needed for this patient.   PT: Add 1 additional visit to work on walking, balance, and strengthening     Pt Provider:  Dr. Dejesus    Phone Number Homecare Nurse can be reached at: [phone]    Can we leave a detailed message on this number? YES voice mail is secure and confidential.     Call taken on 12/5/2022 at 11:58 AM by Tana Fraire

## 2022-12-05 NOTE — TELEPHONE ENCOUNTER
The Home Care/Assisted Living/Nursing Facility is calling regarding an established patient.  Has the patient seen Home Care in the past or is currently residing in Assisted Living or Nursing Facility? Yes.     Elissa calling from [facility] requesting the following orders that are within the Home Care, Assisted Living or Nursing Home Eval and Treatment standing order and can be signed as standing order signature required by RN.    Preferred Call Back Number: [phone]    PT/OT/Speech Therapy    Any additional Orders:  Are there any orders requested, not stated above, that are outside of the standing order and must be routed to a licensed practitioner for approval?    No    Writer has verified Requestor will send fax to have orders signed.    Contacted Elissa and gave verbal ok

## 2022-12-15 NOTE — TELEPHONE ENCOUNTER
Reason for Call: Request for an order or referral:    Order or referral being requested: Ok to discharge from skilled nursing    Date needed: as soon as possible    Has the patient been seen by the PCP for this problem? YES    Additional comments: Needs another visit to discharge and would like her to be scheduled for Alberterol neb in the morning.    Phone number Patient can be reached at:  Other phone number:  [phone]    Best Time:  ASAP    Can we leave a detailed message on this number?  YES    Call taken on 12/15/2022 at 9:44 AM by Sarah Torres  Ok to discharge from skilled nursing,

## 2023-01-01 DIAGNOSIS — R56.9 SEIZURE (H): ICD-10-CM

## 2023-01-01 RX ORDER — LEVETIRACETAM 250 MG/1
TABLET ORAL
Qty: 180 TABLET | Refills: 0 | Status: SHIPPED | OUTPATIENT
Start: 2023-01-01

## 2023-03-26 NOTE — TELEPHONE ENCOUNTER
Routing refill request to provider for review/approval because:  Drug not on the [facility] refill protocol     Last Written Prescription Date:  12/27/2022  Last Fill Quantity: 180,  # refills: 0   Last office visit provider:  4/21/2022     Requested Prescriptions   Pending Prescriptions Disp Refills     levETIRAcetam (KEPPRA) 250 MG tablet [Pharmacy Med Name: levETIRAcetam Oral Tablet 250 MG] 180 tablet 0     Sig: TAKE ONE TABLET BY MOUTH TWICE DAILY       There is no refill protocol information for this order          Belem Rivers RN 03/26/23 12:42 PM